# Patient Record
Sex: FEMALE | Race: WHITE | NOT HISPANIC OR LATINO | Employment: OTHER | URBAN - METROPOLITAN AREA
[De-identification: names, ages, dates, MRNs, and addresses within clinical notes are randomized per-mention and may not be internally consistent; named-entity substitution may affect disease eponyms.]

---

## 2017-08-02 ENCOUNTER — TRANSCRIBE ORDERS (OUTPATIENT)
Dept: ADMINISTRATIVE | Facility: HOSPITAL | Age: 57
End: 2017-08-02

## 2017-08-02 DIAGNOSIS — M81.0 OSTEOPOROSIS, UNSPECIFIED: Primary | ICD-10-CM

## 2017-08-25 ENCOUNTER — HOSPITAL ENCOUNTER (OUTPATIENT)
Dept: BONE DENSITY | Facility: HOSPITAL | Age: 57
Discharge: HOME | End: 2017-08-25
Admitting: NURSE PRACTITIONER

## 2017-08-25 DIAGNOSIS — M81.0 OSTEOPOROSIS, UNSPECIFIED: ICD-10-CM

## 2017-08-25 PROCEDURE — 77080 DXA BONE DENSITY AXIAL: CPT

## 2019-07-25 ENCOUNTER — TRANSCRIBE ORDERS (OUTPATIENT)
Dept: ADMINISTRATIVE | Facility: HOSPITAL | Age: 59
End: 2019-07-25

## 2019-07-25 DIAGNOSIS — Z12.31 VISIT FOR SCREENING MAMMOGRAM: Primary | ICD-10-CM

## 2019-09-06 ENCOUNTER — HOSPITAL ENCOUNTER (OUTPATIENT)
Dept: MAMMOGRAPHY | Facility: HOSPITAL | Age: 59
Discharge: HOME | End: 2019-09-06
Admitting: NURSE PRACTITIONER

## 2019-09-06 DIAGNOSIS — Z12.31 VISIT FOR SCREENING MAMMOGRAM: ICD-10-CM

## 2019-09-06 PROCEDURE — 77063 BREAST TOMOSYNTHESIS BI: CPT

## 2019-09-06 PROCEDURE — 77067 SCR MAMMO BI INCL CAD: CPT

## 2019-09-06 PROCEDURE — 77063 BREAST TOMOSYNTHESIS BI: CPT | Performed by: RADIOLOGY

## 2019-09-06 PROCEDURE — 77067 SCR MAMMO BI INCL CAD: CPT | Performed by: RADIOLOGY

## 2020-07-24 ENCOUNTER — TRANSCRIBE ORDERS (OUTPATIENT)
Dept: ADMINISTRATIVE | Facility: HOSPITAL | Age: 60
End: 2020-07-24

## 2020-07-24 DIAGNOSIS — M81.0 MENOPAUSAL OSTEOPOROSIS: ICD-10-CM

## 2020-07-24 DIAGNOSIS — Z12.31 VISIT FOR SCREENING MAMMOGRAM: Primary | ICD-10-CM

## 2020-12-11 ENCOUNTER — HOSPITAL ENCOUNTER (OUTPATIENT)
Dept: MAMMOGRAPHY | Facility: HOSPITAL | Age: 60
Discharge: HOME | End: 2020-12-11

## 2020-12-11 ENCOUNTER — HOSPITAL ENCOUNTER (OUTPATIENT)
Dept: BONE DENSITY | Facility: HOSPITAL | Age: 60
Discharge: HOME | End: 2020-12-11

## 2020-12-11 DIAGNOSIS — Z12.31 VISIT FOR SCREENING MAMMOGRAM: ICD-10-CM

## 2020-12-11 DIAGNOSIS — M81.0 MENOPAUSAL OSTEOPOROSIS: ICD-10-CM

## 2020-12-11 PROCEDURE — 77067 SCR MAMMO BI INCL CAD: CPT | Performed by: RADIOLOGY

## 2020-12-11 PROCEDURE — 77067 SCR MAMMO BI INCL CAD: CPT

## 2020-12-11 PROCEDURE — 77080 DXA BONE DENSITY AXIAL: CPT

## 2020-12-11 PROCEDURE — 77063 BREAST TOMOSYNTHESIS BI: CPT | Performed by: RADIOLOGY

## 2020-12-11 PROCEDURE — 77063 BREAST TOMOSYNTHESIS BI: CPT

## 2021-09-03 ENCOUNTER — HOSPITAL ENCOUNTER (EMERGENCY)
Facility: HOSPITAL | Age: 61
Discharge: HOME | End: 2021-09-04
Attending: EMERGENCY MEDICINE | Admitting: EMERGENCY MEDICINE

## 2021-09-03 VITALS
HEART RATE: 89 BPM | TEMPERATURE: 98.5 F | SYSTOLIC BLOOD PRESSURE: 119 MMHG | OXYGEN SATURATION: 92 % | HEIGHT: 61 IN | WEIGHT: 92 LBS | RESPIRATION RATE: 16 BRPM | DIASTOLIC BLOOD PRESSURE: 60 MMHG | BODY MASS INDEX: 17.37 KG/M2

## 2021-09-03 DIAGNOSIS — T18.9XXA SWALLOWED FOREIGN BODY, INITIAL ENCOUNTER: Primary | ICD-10-CM

## 2021-09-03 PROCEDURE — 99282 EMERGENCY DEPT VISIT SF MDM: CPT

## 2021-09-04 ENCOUNTER — APPOINTMENT (OUTPATIENT)
Dept: GENERAL RADIOLOGY | Facility: HOSPITAL | Age: 61
End: 2021-09-04

## 2021-09-04 PROCEDURE — 74022 RADEX COMPL AQT ABD SERIES: CPT

## 2021-09-04 NOTE — ED NOTES
Dr. Larios called per Dr. Claudio without answer. Voicemail left at this time.      Carlos Lyle  09/04/21 0126

## 2021-09-04 NOTE — ED NOTES
Dr. Larios called again at this time. Still no answer. House Supervisor called and made aware. Zina stated that she attempt to call him at this time.      Carlos Lyle  09/04/21 0213

## 2021-09-04 NOTE — NURSING NOTE
Placed 2 calls to Dr. Larios at this time in regards to a Pt in the ED that Dr. Claudio wanted to ask him to consult on. 1 voicemail message left.

## 2021-09-04 NOTE — ED PROVIDER NOTES
Subjective   61-year-old female presenting with swallowed foreign body.  She states that about 1-1/2 hours prior to arrival she accidentally swallowed the tab on a pop can.  She states that she feels like it is stuck in her throat.  She has no other complaints.  She is concerned because she has history of an ileostomy about 40 years ago, is concerned that the pop tab would not pass through.          Review of Systems   Constitutional: Negative.    HENT: Negative.    Eyes: Negative.    Respiratory: Negative.    Cardiovascular: Negative.    Gastrointestinal: Negative.    Genitourinary: Negative.    Musculoskeletal: Negative.    Skin: Negative.    Neurological: Negative.    Psychiatric/Behavioral: Negative.        Past Medical History:   Diagnosis Date   • Crohn disease (CMS/HCC)    • Disease of thyroid gland        Allergies   Allergen Reactions   • Codeine GI Intolerance   • Zithromax [Azithromycin] Itching       Past Surgical History:   Procedure Laterality Date   • ILEOSTOMY         Family History   Problem Relation Age of Onset   • Breast cancer Neg Hx    • Ovarian cancer Neg Hx        Social History     Socioeconomic History   • Marital status:      Spouse name: Not on file   • Number of children: Not on file   • Years of education: Not on file   • Highest education level: Not on file   Tobacco Use   • Smoking status: Current Every Day Smoker     Packs/day: 0.50     Types: Cigarettes   Substance and Sexual Activity   • Alcohol use: Yes           Objective   Physical Exam  Constitutional:       General: She is not in acute distress.     Appearance: Normal appearance. She is not ill-appearing, toxic-appearing or diaphoretic.   HENT:      Head: Normocephalic and atraumatic.      Right Ear: External ear normal.      Left Ear: External ear normal.      Nose: Nose normal.      Mouth/Throat:      Mouth: Mucous membranes are moist.      Pharynx: Oropharynx is clear.   Eyes:      Extraocular Movements: Extraocular  movements intact.   Cardiovascular:      Rate and Rhythm: Normal rate and regular rhythm.      Pulses: Normal pulses.      Heart sounds: Normal heart sounds.   Pulmonary:      Effort: Pulmonary effort is normal. No respiratory distress.      Breath sounds: Normal breath sounds.   Abdominal:      General: Bowel sounds are normal. There is no distension.      Tenderness: There is no abdominal tenderness.      Comments: Ostomy present   Musculoskeletal:         General: No swelling, tenderness or deformity. Normal range of motion.      Cervical back: Normal range of motion.   Skin:     General: Skin is warm and dry.      Capillary Refill: Capillary refill takes less than 2 seconds.      Findings: No rash.   Neurological:      General: No focal deficit present.      Mental Status: She is alert and oriented to person, place, and time.   Psychiatric:         Mood and Affect: Mood normal.         Behavior: Behavior normal.         Procedures           ED Course                                           MDM  Number of Diagnoses or Management Options  Swallowed foreign body, initial encounter  Diagnosis management comments: 61-year-old female with concern about swallowed foreign body.  Well-developed, well-nourished lady in no distress with exam as above.  Will obtain x-rays.  Disposition pending.    DDx: Swallowed foreign body    X-ray does reveal foreign body in the area of the gastric body.  Patient is very concerned given her ileostomy.  So I did attempt to contact Dr. Larios with general surgery, unfortunately we were unable to get him on the phone.  Patient is understandably upset.  I do feel patient is safe for discharge home as I do not think surgical consultation would change her disposition.  We will try to reach someone from the surgical service first thing in the morning and call patient back.  She is agreeable.      Final diagnoses:   Swallowed foreign body, initial encounter          Rashard Claudio,  MD  09/04/21 0337

## 2021-09-04 NOTE — ED NOTES
Dr. Larios called again at this time. Still no answer. Another voicemail left.      Carlos Lyle  09/04/21 0152

## 2021-09-04 NOTE — ED NOTES
Pt upset that she is still here waiting on surgeon to call back. MD notified. House supervisor is attempting to call [facility] and Dr Larios again     Naomie Ba, RN  09/04/21 3593

## 2022-07-20 ENCOUNTER — HOSPITAL ENCOUNTER (EMERGENCY)
Facility: HOSPITAL | Age: 62
Discharge: ANOTHER HOSPITAL | End: 2022-07-20
Attending: EMERGENCY MEDICINE | Admitting: EMERGENCY MEDICINE

## 2022-07-20 ENCOUNTER — APPOINTMENT (OUTPATIENT)
Dept: CT IMAGING | Facility: HOSPITAL | Age: 62
End: 2022-07-20

## 2022-07-20 ENCOUNTER — APPOINTMENT (OUTPATIENT)
Dept: GENERAL RADIOLOGY | Facility: HOSPITAL | Age: 62
End: 2022-07-20

## 2022-07-20 VITALS
OXYGEN SATURATION: 96 % | WEIGHT: 90 LBS | HEART RATE: 80 BPM | DIASTOLIC BLOOD PRESSURE: 74 MMHG | BODY MASS INDEX: 16.99 KG/M2 | TEMPERATURE: 98.1 F | SYSTOLIC BLOOD PRESSURE: 111 MMHG | RESPIRATION RATE: 16 BRPM | HEIGHT: 61 IN

## 2022-07-20 DIAGNOSIS — M54.2 PAIN OF NECK WITH RECENT TRAUMATIC INJURY: Primary | ICD-10-CM

## 2022-07-20 PROCEDURE — 25010000002 DEXAMETHASONE SODIUM PHOSPHATE 10 MG/ML SOLUTION: Performed by: NURSE PRACTITIONER

## 2022-07-20 PROCEDURE — 73030 X-RAY EXAM OF SHOULDER: CPT

## 2022-07-20 PROCEDURE — 96374 THER/PROPH/DIAG INJ IV PUSH: CPT

## 2022-07-20 PROCEDURE — 96375 TX/PRO/DX INJ NEW DRUG ADDON: CPT

## 2022-07-20 PROCEDURE — 25010000002 FENTANYL CITRATE (PF) 50 MCG/ML SOLUTION: Performed by: NURSE PRACTITIONER

## 2022-07-20 PROCEDURE — 99283 EMERGENCY DEPT VISIT LOW MDM: CPT

## 2022-07-20 PROCEDURE — 72125 CT NECK SPINE W/O DYE: CPT

## 2022-07-20 PROCEDURE — 25010000002 KETOROLAC TROMETHAMINE PER 15 MG: Performed by: NURSE PRACTITIONER

## 2022-07-20 PROCEDURE — 25010000002 ORPHENADRINE CITRATE PER 60 MG: Performed by: NURSE PRACTITIONER

## 2022-07-20 RX ORDER — SODIUM CHLORIDE 0.9 % (FLUSH) 0.9 %
10 SYRINGE (ML) INJECTION AS NEEDED
Status: DISCONTINUED | OUTPATIENT
Start: 2022-07-20 | End: 2022-07-20 | Stop reason: HOSPADM

## 2022-07-20 RX ORDER — KETOROLAC TROMETHAMINE 30 MG/ML
15 INJECTION, SOLUTION INTRAMUSCULAR; INTRAVENOUS ONCE
Status: COMPLETED | OUTPATIENT
Start: 2022-07-20 | End: 2022-07-20

## 2022-07-20 RX ORDER — ORPHENADRINE CITRATE 30 MG/ML
60 INJECTION INTRAMUSCULAR; INTRAVENOUS ONCE
Status: COMPLETED | OUTPATIENT
Start: 2022-07-20 | End: 2022-07-20

## 2022-07-20 RX ORDER — ALBUTEROL SULFATE 90 UG/1
2 AEROSOL, METERED RESPIRATORY (INHALATION) EVERY 4 HOURS PRN
COMMUNITY

## 2022-07-20 RX ORDER — FENTANYL CITRATE 50 UG/ML
25 INJECTION, SOLUTION INTRAMUSCULAR; INTRAVENOUS
Status: DISCONTINUED | OUTPATIENT
Start: 2022-07-20 | End: 2022-07-20 | Stop reason: HOSPADM

## 2022-07-20 RX ORDER — TRAZODONE HYDROCHLORIDE 50 MG/1
50 TABLET ORAL NIGHTLY
COMMUNITY

## 2022-07-20 RX ORDER — DEXAMETHASONE SODIUM PHOSPHATE 10 MG/ML
10 INJECTION, SOLUTION INTRAMUSCULAR; INTRAVENOUS ONCE
Status: COMPLETED | OUTPATIENT
Start: 2022-07-20 | End: 2022-07-20

## 2022-07-20 RX ORDER — LEVOTHYROXINE SODIUM 0.2 MG/1
200 TABLET ORAL DAILY
COMMUNITY

## 2022-07-20 RX ADMIN — DEXAMETHASONE SODIUM PHOSPHATE 10 MG: 10 INJECTION INTRAMUSCULAR; INTRAVENOUS at 15:51

## 2022-07-20 RX ADMIN — KETOROLAC TROMETHAMINE 15 MG: 30 INJECTION, SOLUTION INTRAMUSCULAR; INTRAVENOUS at 15:50

## 2022-07-20 RX ADMIN — ORPHENADRINE CITRATE 60 MG: 60 INJECTION INTRAMUSCULAR; INTRAVENOUS at 15:48

## 2022-07-20 RX ADMIN — FENTANYL CITRATE 25 MCG: 50 INJECTION INTRAMUSCULAR; INTRAVENOUS at 18:49

## 2022-07-20 NOTE — ED PROVIDER NOTES
Subjective   62-year-old female presents to the ED today for complaint of a fall a week ago.  She says she was coughing and fell off the porch.  She hit her right side but her left neck and shoulder is painful.  She has pain with range of motion.  She has pain with just sitting doing nothing.  She has been taking ibuprofen and it helps some along with some pain patches that she has been using.  She denies any other injury today.  The pain is keeping her awake at night.          Review of Systems   Constitutional: Negative.    HENT: Negative.    Eyes: Negative.    Respiratory: Negative.    Cardiovascular: Negative.    Gastrointestinal: Negative.    Endocrine: Negative.    Genitourinary: Negative.    Musculoskeletal: Positive for joint swelling, myalgias and neck pain.   Skin: Negative.    Allergic/Immunologic: Negative.    Neurological: Negative.    Hematological: Negative.    Psychiatric/Behavioral: Negative.        Past Medical History:   Diagnosis Date   • Crohn disease (HCC)    • Disease of thyroid gland        Allergies   Allergen Reactions   • Codeine GI Intolerance   • Zithromax [Azithromycin] Itching       Past Surgical History:   Procedure Laterality Date   • ILEOSTOMY         Family History   Problem Relation Age of Onset   • Breast cancer Neg Hx    • Ovarian cancer Neg Hx        Social History     Socioeconomic History   • Marital status:    Tobacco Use   • Smoking status: Current Every Day Smoker     Packs/day: 0.50     Types: Cigarettes   Substance and Sexual Activity   • Alcohol use: Yes           Objective   Physical Exam  Constitutional:       Appearance: Normal appearance.   HENT:      Head: Normocephalic and atraumatic.      Mouth/Throat:      Mouth: Mucous membranes are moist.   Eyes:      Extraocular Movements: Extraocular movements intact.      Conjunctiva/sclera: Conjunctivae normal.      Pupils: Pupils are equal, round, and reactive to light.   Neck:      Comments: Left-sided neck  tenderness with movement of neck and shoulder  Cardiovascular:      Rate and Rhythm: Normal rate and regular rhythm.      Pulses: Normal pulses.      Heart sounds: Normal heart sounds.   Pulmonary:      Effort: Pulmonary effort is normal.      Breath sounds: Normal breath sounds.   Abdominal:      General: Abdomen is flat.   Musculoskeletal:         General: Tenderness present.      Cervical back: Tenderness present.      Comments: Tenderness to left arm and shoulder   Skin:     General: Skin is warm and dry.      Capillary Refill: Capillary refill takes less than 2 seconds.   Neurological:      Mental Status: She is alert and oriented to person, place, and time.   Psychiatric:         Mood and Affect: Mood normal.         Behavior: Behavior normal.         Thought Content: Thought content normal.         Judgment: Judgment normal.         Procedures           ED Course  ED Course as of 07/20/22 1739 Wed Jul 20, 2022   1605 Discussed this fracture and CT scan results with Dr. Kelly who believes that this may be surgical and need to be fused.  He recommends calling . [JK]   1645 Called  1630, await call back  [JK]   1654 Asked CT to powershare images to  [JK]   1702 Discussed with Dr Wing,  kcats for transfer to  er [JK]      ED Course User Index  [JK] Keena Pond, APRN                                           [facility]    Final diagnoses:   Pain of neck with recent traumatic injury       ED Disposition  ED Disposition     ED Disposition   Transfer to Another Facility     Condition   --    Comment   --             No follow-up provider specified.       Medication List      No changes were made to your prescriptions during this visit.          Keena Pond, DORINA  07/20/22 7589

## 2022-08-31 ENCOUNTER — TRANSCRIBE ORDERS (OUTPATIENT)
Dept: ADMINISTRATIVE | Facility: HOSPITAL | Age: 62
End: 2022-08-31

## 2022-08-31 DIAGNOSIS — Z12.31 ENCOUNTER FOR SCREENING MAMMOGRAM FOR MALIGNANT NEOPLASM OF BREAST: Primary | ICD-10-CM

## 2022-09-16 ENCOUNTER — TRANSCRIBE ORDERS (OUTPATIENT)
Dept: ADMINISTRATIVE | Facility: HOSPITAL | Age: 62
End: 2022-09-16

## 2022-09-19 ENCOUNTER — TRANSCRIBE ORDERS (OUTPATIENT)
Dept: ADMINISTRATIVE | Facility: HOSPITAL | Age: 62
End: 2022-09-19

## 2022-09-20 ENCOUNTER — TRANSCRIBE ORDERS (OUTPATIENT)
Dept: ADMINISTRATIVE | Facility: HOSPITAL | Age: 62
End: 2022-09-20

## 2022-09-20 DIAGNOSIS — F17.210 CIGARETTE SMOKER: Primary | ICD-10-CM

## 2022-09-20 DIAGNOSIS — F17.200 CURRENT SMOKER: Primary | ICD-10-CM

## 2022-10-05 ENCOUNTER — HOSPITAL ENCOUNTER (OUTPATIENT)
Dept: CT IMAGING | Facility: HOSPITAL | Age: 62
Discharge: HOME | End: 2022-10-05
Admitting: INTERNAL MEDICINE

## 2022-10-05 DIAGNOSIS — F17.200 CURRENT SMOKER: ICD-10-CM

## 2022-10-05 PROCEDURE — 71271 CT THORAX LUNG CANCER SCR C-: CPT

## 2022-11-16 ENCOUNTER — HOSPITAL ENCOUNTER (OUTPATIENT)
Dept: MAMMOGRAPHY | Facility: HOSPITAL | Age: 62
Discharge: HOME | End: 2022-11-16
Admitting: INTERNAL MEDICINE

## 2022-11-16 DIAGNOSIS — Z12.31 ENCOUNTER FOR SCREENING MAMMOGRAM FOR MALIGNANT NEOPLASM OF BREAST: ICD-10-CM

## 2022-11-16 PROCEDURE — 77067 SCR MAMMO BI INCL CAD: CPT

## 2022-11-16 PROCEDURE — 77063 BREAST TOMOSYNTHESIS BI: CPT

## 2023-01-19 ENCOUNTER — TELEPHONE (OUTPATIENT)
Dept: NEUROSURGERY | Facility: CLINIC | Age: 63
End: 2023-01-19
Payer: COMMERCIAL

## 2023-01-19 NOTE — TELEPHONE ENCOUNTER
PATIENT CALLED BACK IN AND STATES SHE HAD HER MRI AND XRAY DONE AT  IN AUGUST, AND WAS GOING TO  THE DISC OF BOTH ON 1/20/23.

## 2023-01-19 NOTE — TELEPHONE ENCOUNTER
Provider:  Javier  Surgery/Procedure: EBEN   Surgery/Procedure Date: NA   Last visit:   NA  Next visit: 01/24/2022     Reason for call:    Attempted to contact patient to see where she had MRI completed, so I can see if it can be power shared prior to appointment on Tuesday. No answer, LVM to call back.

## 2023-01-20 NOTE — TELEPHONE ENCOUNTER
I requested images to be power shared (XR and MRI). Images located in patient's chart. Closing encounter.

## 2023-01-24 ENCOUNTER — OFFICE VISIT (OUTPATIENT)
Dept: NEUROSURGERY | Facility: CLINIC | Age: 63
End: 2023-01-24
Payer: COMMERCIAL

## 2023-01-24 VITALS — BODY MASS INDEX: 15.86 KG/M2 | WEIGHT: 84 LBS | TEMPERATURE: 97.8 F | HEIGHT: 61 IN

## 2023-01-24 DIAGNOSIS — M50.30 DDD (DEGENERATIVE DISC DISEASE), CERVICAL: ICD-10-CM

## 2023-01-24 DIAGNOSIS — M25.511 ACUTE PAIN OF RIGHT SHOULDER: Primary | ICD-10-CM

## 2023-01-24 PROCEDURE — 99203 OFFICE O/P NEW LOW 30 MIN: CPT | Performed by: NEUROLOGICAL SURGERY

## 2023-01-24 RX ORDER — ONDANSETRON 4 MG/1
4 TABLET, FILM COATED ORAL AS NEEDED
COMMUNITY

## 2023-01-24 RX ORDER — MULTIVITAMIN WITH IRON
250 TABLET ORAL DAILY
COMMUNITY

## 2023-01-24 RX ORDER — LIDOCAINE 5 %
ADHESIVE PATCH, MEDICATED TOPICAL
COMMUNITY
Start: 2022-12-19

## 2023-01-24 RX ORDER — UMECLIDINIUM BROMIDE AND VILANTEROL TRIFENATATE 62.5; 25 UG/1; UG/1
POWDER RESPIRATORY (INHALATION)
COMMUNITY
Start: 2022-12-19

## 2023-01-24 RX ORDER — IBUPROFEN 800 MG/1
800 TABLET ORAL 3 TIMES DAILY PRN
COMMUNITY
Start: 2022-12-19

## 2023-01-24 RX ORDER — NICOTINE POLACRILEX 4 MG/1
1 GUM, CHEWING ORAL DAILY
COMMUNITY
Start: 2022-12-19

## 2023-01-24 RX ORDER — ALENDRONATE SODIUM 70 MG/1
TABLET ORAL
COMMUNITY
Start: 2022-12-19

## 2023-01-24 RX ORDER — VALACYCLOVIR HYDROCHLORIDE 500 MG/1
500 TABLET, FILM COATED ORAL DAILY
COMMUNITY
Start: 2022-12-19

## 2023-01-24 NOTE — PROGRESS NOTES
Patient: Elena Ma  : 1960    Primary Care Provider: Merly Young MD    Requesting Provider: As above        History    Chief Complaint: Right upper extremity pain.    History of Present Illness: Ms. Ma is a 62-year-old unemployed woman who suffered a fall in July.  She struck her right side.  She was noted to have a nondisplaced cervical fracture and was seen at the [facility].  She was treated for a brief period of time with a collar.  She never really had much in the way of neck pain.  Initially she had some left arm symptoms and continues to have some numbness in her left index finger.  However she developed progressive pain involving her right shoulder and upper arm.  Sometimes this will extend more distally.  Symptoms are worse if she simply moves or touches her right upper extremity.  Early on she was treated with Neurontin and muscle relaxants.  She has not done physical therapy as recommended by neurosurgery at the [facility].  The patient smokes heavily.    Review of Systems   Constitutional: Positive for activity change, fatigue and unexpected weight change. Negative for appetite change, chills, diaphoresis and fever.   HENT: Negative for congestion, dental problem, drooling, ear discharge, ear pain, facial swelling, hearing loss, mouth sores, nosebleeds, postnasal drip, rhinorrhea, sinus pressure, sinus pain, sneezing, sore throat, tinnitus, trouble swallowing and voice change.    Eyes: Negative for photophobia, pain, discharge, redness, itching and visual disturbance.   Respiratory: Negative for apnea, cough, choking, chest tightness, shortness of breath, wheezing and stridor.    Cardiovascular: Negative for chest pain, palpitations and leg swelling.   Gastrointestinal: Negative for abdominal distention, abdominal pain, anal bleeding, blood in stool, constipation, diarrhea, nausea, rectal pain and vomiting.   Endocrine: Negative for cold intolerance, heat intolerance,  "polydipsia, polyphagia and polyuria.   Genitourinary: Negative for decreased urine volume, difficulty urinating, dysuria, enuresis, flank pain, frequency, genital sores, hematuria and urgency.   Musculoskeletal: Negative for arthralgias, back pain, gait problem, joint swelling, myalgias, neck pain and neck stiffness.   Skin: Negative for color change, pallor, rash and wound.   Allergic/Immunologic: Negative for environmental allergies, food allergies and immunocompromised state.   Neurological: Positive for numbness. Negative for dizziness, tremors, seizures, syncope, facial asymmetry, speech difficulty, weakness, light-headedness and headaches.   Hematological: Negative for adenopathy. Does not bruise/bleed easily.   Psychiatric/Behavioral: Negative for agitation, behavioral problems, confusion, decreased concentration, dysphoric mood, hallucinations, self-injury, sleep disturbance and suicidal ideas. The patient is not nervous/anxious and is not hyperactive.    All other systems reviewed and are negative.      The patient's past medical history, past surgical history, family history, and social history have been reviewed at length in the electronic medical record.      Physical Exam:   Temp 97.8 °F (36.6 °C)   Ht 154.9 cm (61\")   Wt 38.1 kg (84 lb)   BMI 15.87 kg/m²   CONSTITUTIONAL: Patient is well-nourished, pleasant and appears stated age.  MUSCULOSKELETAL:  Neck tenderness to palpation is not observed.   ROM in the neck is normal.  Simple palpation or attempted movement of her right upper extremity specifically the right shoulder right upper arm induces severe pain.  NEUROLOGICAL:  Orientation, memory, attention span, language function, and cognition have been examined and are intact.  Strength is intact in the upper and lower extremities to direct testing.  Muscle tone is normal throughout.  Station and gait are normal.  Sensation is intact to light touch testing throughout.  Deep tendon reflexes are 1+ " and symmetrical.  Issa's Sign is negative bilaterally. No clonus is elicited.  Coordination is intact.      Medical Decision Making    Data Review:   (All imaging studies were personally reviewed unless stated otherwise)  MRI of the cervical spine dated 8/23/2022 demonstrates some diffuse spondylosis.  There is some broad-based spurring at C5-6.  There is no cord compromise.     CT of the cervical spine dated 7/20/2022 demonstrates a nondisplaced fracture through the left C6-7 facet complex and into the ventral aspect of the lamina on that side.  Other fractures are not noted.    Diagnosis:   1.  Right upper extremity pain likely related to the right shoulder or upper extremity itself.  The complaints and examination would not suggest a cervical issue at this point.  2.  History of left C6-7 nondisplaced facet and laminar fracture, stable.    Treatment Options:   Presently, I would not recommend intervention on her neck.  I am going to make a referral to orthopedics for assessment of her right shoulder and upper arm.       Diagnosis Plan   1. Acute pain of right shoulder        2. DDD (degenerative disc disease), cervical            Scribed for Alphonso Herrera MD by RUPINDER Lopez 1/24/2023 10:40 EST    I, Dr. Herrera, personally performed the services described in the documentation, as scribed in my presence, and it is both accurate and complete.

## 2023-04-21 DIAGNOSIS — M25.511 ARTHRALGIA OF RIGHT SHOULDER REGION: Primary | ICD-10-CM

## 2023-04-24 ENCOUNTER — OFFICE VISIT (OUTPATIENT)
Dept: ORTHOPEDIC SURGERY | Facility: CLINIC | Age: 63
End: 2023-04-24
Payer: COMMERCIAL

## 2023-04-24 VITALS
BODY MASS INDEX: 16.69 KG/M2 | DIASTOLIC BLOOD PRESSURE: 48 MMHG | HEIGHT: 61 IN | SYSTOLIC BLOOD PRESSURE: 102 MMHG | WEIGHT: 88.4 LBS

## 2023-04-24 DIAGNOSIS — M75.01 ADHESIVE CAPSULITIS OF RIGHT SHOULDER: Primary | ICD-10-CM

## 2023-04-24 DIAGNOSIS — M25.511 ARTHRALGIA OF RIGHT SHOULDER REGION: ICD-10-CM

## 2023-04-24 RX ORDER — ALENDRONATE SODIUM 70 MG/1
70 TABLET ORAL
COMMUNITY
Start: 2023-03-08

## 2023-04-24 RX ORDER — LEVOTHYROXINE SODIUM 175 UG/1
1 TABLET ORAL DAILY
COMMUNITY
Start: 2023-03-29

## 2023-04-24 RX ORDER — VARENICLINE TARTRATE 25 MG
KIT ORAL
COMMUNITY
Start: 2023-03-22

## 2023-04-24 NOTE — PROGRESS NOTES
"Subjective   Patient ID: Elena Ma is a 63 y.o. right hand dominant  female referred by Dr. Herrera and is being seen for orthopaedic evaluation today for chronic right shoulder.  Pain of the Right Shoulder (Referred by Dr MINNA Herrera)       CHIEF COMPLAINT:    Right shoulder pain and gradual pronounced stiffness, and with history of a fall last July with a cervical spine compression fracture.    History of Present Illness     63-year-old woman that presents to our orthopedic clinic today referred by Dr. Slick Roche MD for evaluation of a right shoulder condition.  Of note the patient had a history of a fall from a porch to the ground last July 2022 and she tells me that after waiting about 5 days she went for evaluation and was diagnosed with a cervical spine compression fracture.  The fracture was treated with brace immobilization and subsequent rehabilitation therapy with the good recovery.  Then in about August 2022 she began to notice at first pain in her left shoulder then developing both pain and stiffness in her right shoulder.  The right shoulder has gradually become very stiff both in elevation and rotation.  She does report a history of thyroid disorder and mentions that recently her thyroid medication dose required to be changed.  Her shoulder exam today is consistent with adhesive capsulitis or \"frozen shoulder\", and this condition and its treatment were reviewed with the patient and her questions answered.  There is also possibility that she has a small rotator cuff tear and she also has mild osteoarthritis of her right shoulder given her imaging findings, however these conditions would not typically have such pronounced active and passive restriction of motion and shoulder stiffness.  Reviewed the algorithm for treatment of this condition including initial formal physical therapy with anti-inflammatory medication as tolerated, and then depending on her progress should there be a plateau in " therapy or limited recovery, options would include potential manipulation under anesthesia with glenohumeral joint corticosteroid injection therapy, and or arthroscopic evaluation of the shoulder with capsular release.    Past Medical History:   Diagnosis Date   • Arthritis    • Back problem    • Bronchitis    • Cervical compression fracture 07/2022    treated conservatively with a brace x 5 weeks   • COPD, moderate    • Crohn disease    • Disease of thyroid gland    • H/O bladder infections    • Hernia, internal    • Osteoporosis    • Pneumonia    • Rotator cuff syndrome 7 2022   • Scoliosis 7 2020   • Thyroid disease         Past Surgical History:   Procedure Laterality Date   • ILEOSTOMY         Family History   Problem Relation Age of Onset   • Cancer Mother    • Heart disease Father    • Heart attack Father    • Diabetes Sister    • Scoliosis Sister    • Kidney disease Sister    • Crohn's disease Sister    • Lung disease Sister    • Kidney disease Brother    • Diabetes Brother    • Arthritis Brother    • Aneurysm Brother    • Gout Brother    • Broken bones Brother    • Cancer Brother    • Arthritis Brother    • Diabetes Brother    • Breast cancer Neg Hx    • Ovarian cancer Neg Hx         Social History     Socioeconomic History   • Marital status:    Tobacco Use   • Smoking status: Every Day     Packs/day: 1.00     Years: 50.00     Pack years: 50.00     Types: Cigarettes   • Smokeless tobacco: Never   Vaping Use   • Vaping Use: Never used   Substance and Sexual Activity   • Alcohol use: Yes     Alcohol/week: 2.0 standard drinks     Types: 2 Drinks containing 0.5 oz of alcohol per week   • Drug use: Never   • Sexual activity: Not Currently       Allergies   Allergen Reactions   • Codeine GI Intolerance   • Hydrocodone Other (See Comments)     nausea   • Levofloxacin Rash   • Zithromax [Azithromycin] Itching       Review of Systems   Constitutional: Negative for fever.   Musculoskeletal: Positive for  "arthralgias. Negative for gait problem and joint swelling.   Skin: Negative for color change and rash.   Neurological: Negative for weakness.   Psychiatric/Behavioral: Negative for confusion.     I have reviewed the medical and surgical history, family history, social history, medications, and/or allergies, and the review of systems of this report.    Objective   /48 (BP Location: Left arm, Patient Position: Sitting, Cuff Size: Adult)   Ht 154.9 cm (61\")   Wt 40.1 kg (88 lb 6.4 oz)   BMI 16.70 kg/m²   Physical Exam  Vitals reviewed.   Constitutional:       General: She is not in acute distress.     Appearance: She is well-developed.   Skin:     General: Skin is warm and dry.      Findings: No erythema or rash.   Neurological:      Mental Status: She is alert.      Gait: Gait is intact.   Psychiatric:         Speech: Speech normal.       Right Shoulder Exam     Tenderness   Right shoulder tenderness location: diffuse shoulder soreness.    Range of Motion   Active abduction: 90   Passive abduction: 110   External rotation: 30   Forward flexion: 100   Internal rotation 90 degrees: 10     Muscle Strength   Abduction: 4/5   Internal rotation: 5/5   External rotation: 5/5   Biceps: 5/5     Tests   Apprehension: negative  Impingement: positive  Drop arm: negative    Other   Erythema: absent  Pulse: present           Neurologic Exam     Mental Status   Attention: normal.   Speech: speech is normal   Level of consciousness: alert  Knowledge: good.     Motor Exam   Overall muscle tone: normal    Gait, Coordination, and Reflexes     Gait  Gait: normal       Assessment & Plan     Independent Review of Radiographic Studies:    AP, Grashey AP and Y lateral views of the right shoulder, indication to evaluate pain, no prior comparison views or not available, shows no acute fracture or dislocation evident.    Laboratory and Other Studies:  No new results reviewed today.     Medical Decision Making:    Limited progress with " persistent or worsening symptoms of right shoulder adhesive capsulitis.   Continue care plan with any additional work-up and treatment as outlined below.  Medications as prescribed and only as tolerated.  Physical and occupational therapy planned.    Procedures    Diagnoses and all orders for this visit:    1. Adhesive capsulitis of right shoulder (Primary)  -     Ambulatory Referral to Physical Therapy Ortho, Evaluate and treat; ROM, Strengthening    2. Arthralgia of right shoulder region       Discussion of orthopaedic goals and activities and patient expressed appreciation.  Risk, benefits, and merits of treatment alternatives reviewed with the patient and questions answered  Regular exercise as tolerated  Guided on proper techniques for mobility, strength, agility and/or conditioning exercises  Ice, heat, and/or modalities as beneficial  Physical therapy referral given  Take prescribed medications as instructed only as tolerated    Recommendations/Plan:  Exercise, medications, injections, other patient advice, and return appointment as noted.  Brace: No brace was given at today's visit.  Referral: Physical and Occupational Therapy referral.  Test/Studies: No additional studies ordered at this time. Consider MRI or other imaging depending on clinical progress.  Work/Activity Status: Usual activities, routine exercise as tolerated, light physical work as tolerated, no strenuous activity.    Return in about 8 weeks (around 6/19/2023) for Recheck after physical therapy.  Patient is encouraged and agreeable to call or return sooner for any issues or concerns.

## 2023-04-25 ENCOUNTER — PATIENT ROUNDING (BHMG ONLY) (OUTPATIENT)
Dept: ORTHOPEDIC SURGERY | Facility: CLINIC | Age: 63
End: 2023-04-25
Payer: COMMERCIAL

## 2023-04-25 NOTE — PROGRESS NOTES
April 25, 2023    Hello, may I speak with Elena Ma?    My name is Smiley      I am  with [facility] [address] [address] [phone].    Before we get started may I verify your date of birth? 1960    I am calling to officially welcome you to our practice and ask about your recent visit. Is this a good time to talk? No - left voicemail    Tell me about your visit with us. What things went well?         We're always looking for ways to make our patients' experiences even better. Do you have recommendations on ways we may improve?      Overall were you satisfied with your first visit to our practice?       I appreciate you taking the time to speak with me today. Is there anything else I can do for you?       Thank you, and have a great day.

## 2023-07-21 ENCOUNTER — OFFICE VISIT (OUTPATIENT)
Dept: ORTHOPEDIC SURGERY | Facility: CLINIC | Age: 63
End: 2023-07-21
Payer: COMMERCIAL

## 2023-07-21 VITALS
SYSTOLIC BLOOD PRESSURE: 139 MMHG | BODY MASS INDEX: 16.97 KG/M2 | WEIGHT: 89.9 LBS | DIASTOLIC BLOOD PRESSURE: 58 MMHG | HEIGHT: 61 IN | HEART RATE: 66 BPM

## 2023-07-21 DIAGNOSIS — M25.612 STIFFNESS OF LEFT SHOULDER JOINT: ICD-10-CM

## 2023-07-21 DIAGNOSIS — M25.512 ARTHRALGIA OF LEFT SHOULDER REGION: Primary | ICD-10-CM

## 2023-07-21 DIAGNOSIS — M75.02 ADHESIVE CAPSULITIS OF LEFT SHOULDER: ICD-10-CM

## 2023-07-21 PROCEDURE — 99214 OFFICE O/P EST MOD 30 MIN: CPT | Performed by: ORTHOPAEDIC SURGERY

## 2023-07-21 RX ORDER — METHOCARBAMOL 750 MG/1
750 TABLET, FILM COATED ORAL 2 TIMES DAILY PRN
Qty: 40 TABLET | Refills: 0 | Status: SHIPPED | OUTPATIENT
Start: 2023-07-21

## 2023-07-21 RX ORDER — METHYLPREDNISOLONE 4 MG/1
TABLET ORAL
Qty: 21 TABLET | Refills: 0 | Status: SHIPPED | OUTPATIENT
Start: 2023-07-21

## 2023-07-27 ENCOUNTER — TRANSCRIBE ORDERS (OUTPATIENT)
Dept: ADMINISTRATIVE | Facility: HOSPITAL | Age: 63
End: 2023-07-27
Payer: COMMERCIAL

## 2023-07-27 DIAGNOSIS — Z12.31 VISIT FOR SCREENING MAMMOGRAM: Primary | ICD-10-CM

## 2023-07-31 ENCOUNTER — TELEPHONE (OUTPATIENT)
Dept: ORTHOPEDIC SURGERY | Facility: CLINIC | Age: 63
End: 2023-07-31
Payer: COMMERCIAL

## 2023-08-08 DIAGNOSIS — S43.431A LABRAL TEAR OF SHOULDER, RIGHT, INITIAL ENCOUNTER: ICD-10-CM

## 2023-08-08 DIAGNOSIS — M25.612 STIFFNESS OF LEFT SHOULDER JOINT: ICD-10-CM

## 2023-08-08 DIAGNOSIS — M75.02 ADHESIVE CAPSULITIS OF LEFT SHOULDER: ICD-10-CM

## 2023-08-08 DIAGNOSIS — M25.512 ARTHRALGIA OF LEFT SHOULDER REGION: Primary | ICD-10-CM

## 2023-08-08 RX ORDER — DEXAMETHASONE SODIUM PHOSPHATE 4 MG/ML
INJECTION, SOLUTION INTRA-ARTICULAR; INTRALESIONAL; INTRAMUSCULAR; INTRAVENOUS; SOFT TISSUE
Qty: 30 ML | Refills: 0 | Status: SHIPPED | OUTPATIENT
Start: 2023-08-08

## 2023-08-08 NOTE — TELEPHONE ENCOUNTER
Returned call to patient to inform patient [facility], ultrasound is on the order for PT

## 2023-08-08 NOTE — TELEPHONE ENCOUNTER
Patient called the office stating that she has a PT appointment with [facility] this morning at 10:30am. She is wanting someone to conmtact them to make sure that they are doing her tx as Dr. Duncan has ordered. She said that Dr. Duncan mentioned that they would need to do an ultrasound on her left shoulder and she has been there 3 times and they said that it was not on the order. She would like a call back before her appointment if possible to clarify.

## 2023-08-22 DIAGNOSIS — M25.512 ARTHRALGIA OF LEFT SHOULDER REGION: ICD-10-CM

## 2023-08-22 RX ORDER — METHOCARBAMOL 750 MG/1
750 TABLET, FILM COATED ORAL 2 TIMES DAILY PRN
Qty: 40 TABLET | Refills: 0 | Status: SHIPPED | OUTPATIENT
Start: 2023-08-22

## 2023-08-23 ENCOUNTER — TELEPHONE (OUTPATIENT)
Dept: ORTHOPEDIC SURGERY | Facility: CLINIC | Age: 63
End: 2023-08-23
Payer: COMMERCIAL

## 2023-08-23 NOTE — TELEPHONE ENCOUNTER
Caller: Elena Ma    Relationship: Self    Best call back number: [phone]    Requested Prescriptions: ROBAXIN 750 MG  Requested Prescriptions      No prescriptions requested or ordered in this encounter        Pharmacy where request should be sent:  [facility] [phone]    Last office visit with prescribing clinician: 7/21/2023     Next office visit with prescribing clinician: 9/8/2023     Does the patient have less than a 3 day supply:  [x] Yes  [] No

## 2023-08-23 NOTE — TELEPHONE ENCOUNTER
Spoke with patient, notified her the rx was sent yesterday (8/22/23) to [facility]. She states she will call them.

## 2023-08-26 DIAGNOSIS — J44.9 CHRONIC OBSTRUCTIVE PULMONARY DISEASE, UNSPECIFIED COPD TYPE: ICD-10-CM

## 2023-08-28 RX ORDER — UMECLIDINIUM BROMIDE AND VILANTEROL TRIFENATATE 62.5; 25 UG/1; UG/1
POWDER RESPIRATORY (INHALATION)
Qty: 180 EACH | Refills: 0 | Status: SHIPPED | OUTPATIENT
Start: 2023-08-28

## 2023-08-30 ENCOUNTER — HOSPITAL ENCOUNTER (OUTPATIENT)
Dept: MRI IMAGING | Facility: HOSPITAL | Age: 63
Discharge: HOME | End: 2023-08-30
Admitting: ORTHOPAEDIC SURGERY
Payer: COMMERCIAL

## 2023-08-30 PROCEDURE — 73221 MRI JOINT UPR EXTREM W/O DYE: CPT

## 2023-09-08 ENCOUNTER — OFFICE VISIT (OUTPATIENT)
Dept: ORTHOPEDIC SURGERY | Facility: CLINIC | Age: 63
End: 2023-09-08
Payer: COMMERCIAL

## 2023-09-08 VITALS
SYSTOLIC BLOOD PRESSURE: 108 MMHG | HEART RATE: 65 BPM | HEIGHT: 61 IN | BODY MASS INDEX: 16.8 KG/M2 | DIASTOLIC BLOOD PRESSURE: 60 MMHG | WEIGHT: 89 LBS

## 2023-09-08 DIAGNOSIS — M75.52 BURSITIS OF LEFT SHOULDER: Primary | ICD-10-CM

## 2023-09-08 RX ORDER — LIDOCAINE HYDROCHLORIDE 20 MG/ML
2 INJECTION, SOLUTION INFILTRATION; PERINEURAL
Status: COMPLETED | OUTPATIENT
Start: 2023-09-08 | End: 2023-09-08

## 2023-09-08 RX ORDER — METHYLPREDNISOLONE ACETATE 40 MG/ML
40 INJECTION, SUSPENSION INTRA-ARTICULAR; INTRALESIONAL; INTRAMUSCULAR; SOFT TISSUE
Status: COMPLETED | OUTPATIENT
Start: 2023-09-08 | End: 2023-09-08

## 2023-09-08 RX ADMIN — METHYLPREDNISOLONE ACETATE 40 MG: 40 INJECTION, SUSPENSION INTRA-ARTICULAR; INTRALESIONAL; INTRAMUSCULAR; SOFT TISSUE at 10:18

## 2023-09-08 RX ADMIN — LIDOCAINE HYDROCHLORIDE 2 ML: 20 INJECTION, SOLUTION INFILTRATION; PERINEURAL at 10:18

## 2023-09-08 NOTE — PROGRESS NOTES
Office Note     Name: Elena Ma    : 1960     MRN: [id number]     Chief Complaint  Follow-up of the Left Shoulder (Here to review MRI results. States she is still having pain but it is getting better.)    Subjective     History of Present Illness:  Elena Ma is a 63 y.o. female presenting today for left shoulder follow-up to discuss MRI results.  MRI results are shown below.  Patient states that her symptoms seem to be improving with physical therapy although she does have limited range of motion due to pain and continued pain with certain activities.  She denies any new or worsening symptoms.     Review of Systems   Constitutional:  Negative for fever.   HENT:  Negative for dental problem and voice change.    Eyes:  Negative for visual disturbance.   Respiratory:  Negative for shortness of breath.    Cardiovascular:  Negative for chest pain.   Gastrointestinal:  Negative for abdominal pain.   Genitourinary:  Negative for dysuria.   Musculoskeletal:  Positive for arthralgias (left shoulder). Negative for gait problem and joint swelling.   Skin:  Negative for rash.   Neurological:  Negative for speech difficulty.   Hematological:  Does not bruise/bleed easily.   Psychiatric/Behavioral:  Negative for confusion.       Past Medical History:   Diagnosis Date    Arthritis     Back problem     Bronchitis     Cervical compression fracture 2022    treated conservatively with a brace x 5 weeks    COPD, moderate     Crohn disease     Disease of thyroid gland     H/O bladder infections     Hernia, internal     Osteoporosis     Pneumonia     Rotator cuff syndrome 2022    Scoliosis 2020    Thyroid disease         Past Surgical History:   Procedure Laterality Date    ECTOPIC PREGNANCY SURGERY      ILEOSTOMY      STOMACH SURGERY      VAGINA RECONSTRUCTION SURGERY         Family History   Problem Relation Age of Onset    Cancer Mother     Heart disease Father     Heart attack Father     Diabetes Sister      Scoliosis Sister     Kidney disease Sister     Crohn's disease Sister     Lung disease Sister     Kidney disease Brother     Diabetes Brother     Arthritis Brother     Aneurysm Brother     Gout Brother     Broken bones Brother     Cancer Brother     Arthritis Brother     Diabetes Brother     Aneurysm Brother     Breast cancer Neg Hx     Ovarian cancer Neg Hx        Social History     Socioeconomic History    Marital status:    Tobacco Use    Smoking status: Every Day     Packs/day: 1.00     Years: 50.00     Pack years: 50.00     Types: Cigarettes    Smokeless tobacco: Never   Vaping Use    Vaping Use: Never used   Substance and Sexual Activity    Alcohol use: Yes     Alcohol/week: 2.0 standard drinks     Types: 2 Drinks containing 0.5 oz of alcohol per week    Drug use: Never    Sexual activity: Not Currently         Current Outpatient Medications:     albuterol sulfate  (90 Base) MCG/ACT inhaler, Inhale 2 puffs Every 4 (Four) Hours As Needed for Wheezing., Disp: 18 g, Rfl: 5    alendronate (FOSAMAX) 70 MG tablet, Take 1 tablet by mouth Every 7 (Seven) Days., Disp: 12 tablet, Rfl: 1    Anoro Ellipta 62.5-25 MCG/ACT aerosol powder  inhaler, Inhale 1 puff by mouth once daily, Disp: 180 each, Rfl: 0    Cholecalciferol (vitamin D3) 125 MCG (5000 UT) tablet tablet, Take 1 tablet by mouth Daily., Disp: 90 tablet, Rfl: 1    dexAMETHasone (DECADRON) 4 MG/ML injection, USE AS DIRECTED WITH PHYSICAL THERAPY (IONTOPHORESIS), Disp: 30 mL, Rfl: 0    gabapentin (NEURONTIN) 300 MG capsule, Take 1 capsule by mouth 3 (Three) Times a Day., Disp: 90 capsule, Rfl: 0    ibuprofen (ADVIL,MOTRIN) 800 MG tablet, Take 1 tablet by mouth 3 (Three) Times a Day As Needed., Disp: , Rfl:     levothyroxine (SYNTHROID, LEVOTHROID) 137 MCG tablet, Take 1 tablet by mouth Daily., Disp: 30 tablet, Rfl: 1    Lidoderm 5 %, APPLY 1 PATCH TOPICALLY ONCE DAILY (MAY WEAR UP TO 12 HOURS), Disp: , Rfl:     Magnesium 250 MG tablet, Take 1  "tablet by mouth Daily., Disp: , Rfl:     methocarbamol (ROBAXIN) 750 MG tablet, Take 1 tablet by mouth 2 (Two) Times a Day As Needed for Muscle Spasms., Disp: 40 tablet, Rfl: 0    methylPREDNISolone (MEDROL) 4 MG dose pack, Use as directed by package instructions, Disp: 21 tablet, Rfl: 0    Omeprazole 20 MG tablet delayed-release, Take 20 mg by mouth Daily., Disp: 90 each, Rfl: 1    ondansetron (ZOFRAN) 4 MG tablet, Take 1 tablet by mouth As Needed for Nausea or Vomiting., Disp: , Rfl:     Potassium 99 MG tablet, Take 1,000 mcg by mouth Daily., Disp: , Rfl:     traZODone (DESYREL) 50 MG tablet, Take 1 tablet by mouth At Night As Needed for Sleep., Disp: 90 tablet, Rfl: 1    valACYclovir (VALTREX) 500 MG tablet, Take 1 tablet by mouth Daily., Disp: 90 tablet, Rfl: 1    Allergies   Allergen Reactions    Codeine GI Intolerance    Hydrocodone Other (See Comments)     nausea    Levofloxacin Rash    Zithromax [Azithromycin] Itching           Objective   /60   Pulse 65   Ht 154.9 cm (60.98\")   Wt 40.4 kg (89 lb)   BMI 16.83 kg/m²    BMI is below normal parameters (malnutrition). Recommendations: none (medical contraindication)       Physical Exam  Left Shoulder Exam     Range of Motion   Active abduction:  140   Passive abduction:  140   Extension:  30   Forward flexion:  160   Internal rotation 0 degrees:  L4     Muscle Strength   The patient has normal left shoulder strength.    Tests   Montanez test: positive  Impingement: positive  Drop arm: negative    Other   Erythema: absent  Sensation: normal  Pulse: present          Extremity DVT signs are negative by clinical screen.     Independent Review of Radiographic Studies:    Narrative & Impression   LEFT SHOULDER MRI     HISTORY: Shoulder pain.     FINDINGS: Supraspinatus tendon appears intact. Trace amount of fluid is  seen in the subacromial/subdeltoid bursa, probably related to mild  bursitis.     Anterior and posterior glenoid bisi appear intact. Biceps " tendon  appears intact. Subscapularis tendon appears intact.     Minimal hypertrophic changes are seen at the acromioclavicular joint.     IMPRESSION:  1. No evidence of full-thickness supraspinatus tendon tear.  2. Small amount of fluid in subacromial/subdeltoid bursa, consistent  with mild bursitis.     This report was signed and finalized on 8/31/2023 2:35 PM by Ger Bocanegra MD.          Large Joint Arthrocentesis: L subacromial bursa  Date/Time: 9/8/2023 10:18 AM  Consent given by: patient  Site marked: site marked  Timeout: Immediately prior to procedure a time out was called to verify the correct patient, procedure, equipment, support staff and site/side marked as required   Supporting Documentation  Indications: pain   Procedure Details  Location: shoulder - L subacromial bursa  Preparation: Patient was prepped and draped in the usual sterile fashion  Needle size: 22 G  Approach: posterior  Medications administered: 2 mL lidocaine 2%; 40 mg methylPREDNISolone acetate 40 MG/ML  Patient tolerance: patient tolerated the procedure well with no immediate complications        Assessment and Plan   Diagnoses and all orders for this visit:    1. Bursitis of left shoulder (Primary)       Regular exercise as tolerated  Orthopedic activities reviewed and patient expressed appreciation  Discussion of orthopedic goals  Risk, benefits, and merits of treatment alternatives reviewed with the patient and questions answered  Physical therapy ongoing  Call or notify for any adverse effect from injection therapy  Avoid offending activity  Ice, heat, and/or modalities as beneficial  Guided on proper techniques for mobility, strength, agility and/or conditioning exercises    Recommendations/Plan:  Exercise, medications, injections, other patient advice, and return appointment as noted.  Patient is encouraged to call or return for any issues or concerns.    Patient was given a corticosteroid injection to the left shoulder for  symptomatic relief.  Advised her to continue with ongoing therapy.  Advised avoiding aggravating activities as well as over-the-counter analgesics and ice and heat as needed and beneficial.  I recommended over-the-counter Voltaren gel.  Patient is agreeable with plan.  I recommend her to follow-up as needed if her symptoms worsen or persist.    Return if symptoms worsen or fail to improve.  Patient agreeable to call or return sooner for any concerns.

## 2023-10-27 ENCOUNTER — OFFICE VISIT (OUTPATIENT)
Dept: INTERNAL MEDICINE | Facility: CLINIC | Age: 63
End: 2023-10-27
Payer: COMMERCIAL

## 2023-10-27 VITALS
DIASTOLIC BLOOD PRESSURE: 64 MMHG | BODY MASS INDEX: 16.99 KG/M2 | HEIGHT: 61 IN | SYSTOLIC BLOOD PRESSURE: 98 MMHG | TEMPERATURE: 97.6 F | OXYGEN SATURATION: 95 % | HEART RATE: 56 BPM | WEIGHT: 90 LBS

## 2023-10-27 DIAGNOSIS — M81.0 AGE-RELATED OSTEOPOROSIS WITHOUT CURRENT PATHOLOGICAL FRACTURE: ICD-10-CM

## 2023-10-27 DIAGNOSIS — M25.512 ARTHRALGIA OF LEFT SHOULDER REGION: ICD-10-CM

## 2023-10-27 DIAGNOSIS — M43.12 SPONDYLOLISTHESIS OF CERVICAL REGION: ICD-10-CM

## 2023-10-27 DIAGNOSIS — K50.813 CROHN'S DISEASE OF BOTH SMALL AND LARGE INTESTINE WITH FISTULA: ICD-10-CM

## 2023-10-27 DIAGNOSIS — E83.52 HYPERCALCEMIA: ICD-10-CM

## 2023-10-27 DIAGNOSIS — Z87.81 HISTORY OF CERVICAL FRACTURE: ICD-10-CM

## 2023-10-27 DIAGNOSIS — E78.5 HYPERLIPIDEMIA, UNSPECIFIED HYPERLIPIDEMIA TYPE: ICD-10-CM

## 2023-10-27 DIAGNOSIS — E03.9 HYPOTHYROIDISM, UNSPECIFIED TYPE: ICD-10-CM

## 2023-10-27 DIAGNOSIS — J44.9 CHRONIC OBSTRUCTIVE PULMONARY DISEASE, UNSPECIFIED COPD TYPE: ICD-10-CM

## 2023-10-27 DIAGNOSIS — F17.210 SMOKING GREATER THAN 20 PACK YEARS: ICD-10-CM

## 2023-10-27 DIAGNOSIS — Z00.00 ANNUAL PHYSICAL EXAM: Primary | ICD-10-CM

## 2023-10-27 DIAGNOSIS — N18.1 CHRONIC RENAL IMPAIRMENT, STAGE 1: ICD-10-CM

## 2023-10-27 DIAGNOSIS — E55.9 VITAMIN D DEFICIENCY: ICD-10-CM

## 2023-10-27 PROCEDURE — 99396 PREV VISIT EST AGE 40-64: CPT | Performed by: NURSE PRACTITIONER

## 2023-10-27 PROCEDURE — 1160F RVW MEDS BY RX/DR IN RCRD: CPT | Performed by: NURSE PRACTITIONER

## 2023-10-27 PROCEDURE — 1159F MED LIST DOCD IN RCRD: CPT | Performed by: NURSE PRACTITIONER

## 2023-10-27 RX ORDER — VARENICLINE TARTRATE 0.5 MG/1
TABLET, FILM COATED ORAL
Qty: 13 TABLET | Refills: 0 | Status: SHIPPED | OUTPATIENT
Start: 2023-10-27 | End: 2023-11-04

## 2023-10-27 RX ORDER — METHOCARBAMOL 750 MG/1
750 TABLET, FILM COATED ORAL 2 TIMES DAILY PRN
Qty: 40 TABLET | Refills: 0 | Status: SHIPPED | OUTPATIENT
Start: 2023-10-27

## 2023-10-27 RX ORDER — LIDOCAINE 5 %
ADHESIVE PATCH, MEDICATED TOPICAL
Qty: 30 PATCH | Refills: 5 | Status: SHIPPED | OUTPATIENT
Start: 2023-10-27

## 2023-10-27 RX ORDER — GABAPENTIN 300 MG/1
300 CAPSULE ORAL 3 TIMES DAILY PRN
Qty: 90 CAPSULE | Refills: 0 | Status: SHIPPED | OUTPATIENT
Start: 2023-10-27

## 2023-10-27 NOTE — PROGRESS NOTES
Female Physical Note      Date: 10/27/2023   Patient Name: Elena Ma  : 1960   MRN: [id number]     Chief Complaint:    Chief Complaint   Patient presents with    Annual Exam       History of Present Illness: Elena Ma is a 63 y.o. female who is here today for their annual health maintenance and physical.        Subjective      Review of Systems:   Review of Systems   Musculoskeletal:  Positive for arthralgias.   All other systems reviewed and are negative.      Past Medical History, Social History, Family History and Care Team were all reviewed with patient and updated as appropriate.     Medications:     Current Outpatient Medications:     gabapentin (NEURONTIN) 300 MG capsule, Take 1 capsule by mouth 3 (Three) Times a Day As Needed (neuropathc pain)., Disp: 90 capsule, Rfl: 0    Lidoderm 5 %, Remove & Discard patch within 12 hours or as directed by MD, Disp: 30 patch, Rfl: 5    methocarbamol (ROBAXIN) 750 MG tablet, Take 1 tablet by mouth 2 (Two) Times a Day As Needed for Muscle Spasms., Disp: 40 tablet, Rfl: 0    albuterol sulfate  (90 Base) MCG/ACT inhaler, Inhale 2 puffs Every 4 (Four) Hours As Needed for Wheezing., Disp: 18 g, Rfl: 5    alendronate (FOSAMAX) 70 MG tablet, Take 1 tablet by mouth Every 7 (Seven) Days., Disp: 12 tablet, Rfl: 1    Anoro Ellipta 62.5-25 MCG/ACT aerosol powder  inhaler, Inhale 1 puff by mouth once daily, Disp: 180 each, Rfl: 0    Cholecalciferol (vitamin D3) 125 MCG (5000 UT) tablet tablet, Take 1 tablet by mouth Daily., Disp: 90 tablet, Rfl: 1    Diclofenac Sodium (VOLTAREN EX), Apply  topically., Disp: , Rfl:     ibuprofen (ADVIL,MOTRIN) 800 MG tablet, Take 1 tablet by mouth 3 (Three) Times a Day As Needed., Disp: , Rfl:     levothyroxine (SYNTHROID, LEVOTHROID) 137 MCG tablet, Take 1 tablet by mouth Daily., Disp: 30 tablet, Rfl: 1    Magnesium 250 MG tablet, Take 1 tablet by mouth Daily., Disp: , Rfl:     Omeprazole 20 MG tablet delayed-release, Take  20 mg by mouth Daily., Disp: 90 each, Rfl: 1    ondansetron (ZOFRAN) 4 MG tablet, Take 1 tablet by mouth As Needed for Nausea or Vomiting., Disp: , Rfl:     Potassium 99 MG tablet, Take 1,000 mcg by mouth Daily., Disp: , Rfl:     traZODone (DESYREL) 50 MG tablet, Take 1 tablet by mouth At Night As Needed for Sleep., Disp: 90 tablet, Rfl: 1    valACYclovir (VALTREX) 500 MG tablet, Take 1 tablet by mouth Daily., Disp: 90 tablet, Rfl: 1    varenicline (Chantix) 0.5 MG tablet, Take 1 tablet by mouth Daily for 3 days, THEN 1 tablet 2 (Two) Times a Day for 5 days., Disp: 13 tablet, Rfl: 0    Allergies:   Allergies   Allergen Reactions    Codeine GI Intolerance    Hydrocodone Other (See Comments)     nausea    Levofloxacin Rash    Zithromax [Azithromycin] Itching       Immunizations:  Immunization History   Administered Date(s) Administered    COVID-19 (SIDDHARTHA) 03/16/2021, 11/02/2021    COVID-19 (MODERNA) BIVALENT 12+YRS 12/13/2022    Pneumococcal Polysaccharide (PPSV23) 01/11/2018    Tdap 03/22/2023       Colorectal Screening: Crohn's disease, Hx colectomy, Ileoscopy through stoma attempted March 2023, stenosed ileostomy which prohibited passage of the scope. Suppose to follow up GI. Will call and schedule with Dr. Brittany Quan at [facility].     Pap:  Up-To-Date   Last Completed Pap Smear            PAP SMEAR (Every 3 Years) Next due on 12/13/2025 12/13/2022  Done                   Mammogram:  Up-To-Date, next scheduled for November 21  Last Completed Mammogram            Scheduled - MAMMOGRAM (Every 2 Years) Scheduled for 11/21/2023 11/16/2022  Mammo Screening Digital Tomosynthesis Bilateral With CAD    12/11/2020  Mammo Screening Digital Tomosynthesis Bilateral With CAD    09/06/2019  Mammo Screening Digital Tomosynthesis Bilateral With CAD    04/11/2014  MAMMOGRAPHY SCREENING BILATERAL                     CT for Smoker (Age 50-80, 20 pk yr):  Oct 2022, order placed   Bone Density/DEXA (Age 65 or high  "risk): scheduled for November 21  Hep C (Age 18-79 once):  previously negative  A1c: utd  Lipid panel: utd  Ophthalmologist: established, going to schedule [facility]  Dentist: established, [facility]    Tobacco Use: High Risk (10/27/2023)    Patient History     Smoking Tobacco Use: Every Day     Smokeless Tobacco Use: Never     Passive Exposure: Not on file       Social History     Substance and Sexual Activity   Alcohol Use Yes    Alcohol/week: 2.0 standard drinks of alcohol    Types: 2 Drinks containing 0.5 oz of alcohol per week        Social History     Substance and Sexual Activity   Drug Use Never        Diet/Physical activity: counseled on 10/27/23      Sexual Health: no concerns   Menopause: postmenopausal    Objective     Physical Exam:  Vital Signs:   Vitals:    10/27/23 1329   BP: 98/64   Pulse: 56   Temp: 97.6 °F (36.4 °C)   SpO2: 95%   Weight: 40.8 kg (90 lb)   Height: 154.9 cm (60.98\")   PainSc:   5   PainLoc: Shoulder     Body mass index is 17.02 kg/m².     Physical Exam  Vitals and nursing note reviewed.   Constitutional:       General: She is not in acute distress.     Appearance: Normal appearance. She is underweight.   HENT:      Head: Normocephalic and atraumatic.      Right Ear: Tympanic membrane, ear canal and external ear normal.      Left Ear: Tympanic membrane, ear canal and external ear normal.      Nose: Nose normal.      Mouth/Throat:      Mouth: Mucous membranes are moist.      Pharynx: Oropharynx is clear.   Eyes:      General: No scleral icterus.     Extraocular Movements: Extraocular movements intact.      Conjunctiva/sclera: Conjunctivae normal.      Pupils: Pupils are equal, round, and reactive to light.   Neck:      Thyroid: No thyromegaly.   Cardiovascular:      Rate and Rhythm: Normal rate and regular rhythm.      Heart sounds: Normal heart sounds.   Pulmonary:      Effort: Pulmonary effort is normal.      Breath sounds: Normal breath sounds.   Abdominal:      General: Abdomen is " flat. Bowel sounds are normal. There is no distension.      Palpations: Abdomen is soft.      Tenderness: There is no abdominal tenderness. There is no guarding.   Genitourinary:     Comments: Defer pelvic exam  Musculoskeletal:         General: Normal range of motion.      Cervical back: Normal range of motion and neck supple.   Lymphadenopathy:      Cervical: No cervical adenopathy.   Skin:     General: Skin is warm and dry.      Coloration: Skin is not jaundiced or pale.      Findings: No rash.   Neurological:      Mental Status: She is alert and oriented to person, place, and time.      Cranial Nerves: No cranial nerve deficit.      Motor: No weakness.      Coordination: Coordination normal.      Gait: Gait normal.   Psychiatric:         Mood and Affect: Mood normal.         Behavior: Behavior normal.         Thought Content: Thought content normal.         Assessment / Plan      Assessment/Plan:   Problem List Items Addressed This Visit    None  Visit Diagnoses       Annual physical exam    -  Primary    Hypothyroidism, unspecified type        Chronic renal impairment, stage 1        Chronic obstructive pulmonary disease, unspecified COPD type        Hyperlipidemia, unspecified hyperlipidemia type        Vitamin D deficiency        Age-related osteoporosis without current pathological fracture        Crohn's disease of both small and large intestine with fistula        Spondylolisthesis of cervical region        Relevant Medications    gabapentin (NEURONTIN) 300 MG capsule    History of cervical fracture        Relevant Medications    gabapentin (NEURONTIN) 300 MG capsule    Arthralgia of left shoulder region        Relevant Medications    methocarbamol (ROBAXIN) 750 MG tablet    Hypercalcemia        Relevant Orders    PTH, Intact & Calcium    Smoking greater than 20 pack years        Relevant Orders    CT Chest Low Dose Wo            Healthcare Maintenance:  Counseling provided based on age appropriate USPSTF  guidelines.  Health Maintenance screenings and vaccinations updated, encouraged  Declines vaccines   Chronic conditions stable at this time   Update TSH since decreasing dose, recheck calcium   Mammogram; ordered   Pap smear; UTD  Colon cancer screening: due, will follow up [facility] Dr. [facility]    DEXA scan: scheduled   CT lung cancer screening: ordered   Encouraged 150 minutes of exercise total per week for heart health   Recommend balanced diet, portion control, limiting excess carbs and sweets, limit caffeine   Dental visits twice per year, yearly eye exams   Vitamin D 5000 IU daily       Elena Ma voices understanding and acceptance of this advice and will call back with any further questions or concerns. AVS with preventive healthcare tips printed for patient.         Follow Up:   Return in about 6 months (around 4/27/2024).      DORINA Mcrae  [facility]

## 2023-10-30 DIAGNOSIS — E03.9 HYPOTHYROIDISM, UNSPECIFIED TYPE: ICD-10-CM

## 2023-10-30 RX ORDER — LEVOTHYROXINE SODIUM 0.15 MG/1
150 TABLET ORAL DAILY
Qty: 90 TABLET | Refills: 0 | Status: SHIPPED | OUTPATIENT
Start: 2023-10-30

## 2023-12-04 ENCOUNTER — OFFICE VISIT (OUTPATIENT)
Dept: INTERNAL MEDICINE | Facility: CLINIC | Age: 63
End: 2023-12-04
Payer: COMMERCIAL

## 2023-12-04 VITALS
TEMPERATURE: 98.7 F | DIASTOLIC BLOOD PRESSURE: 66 MMHG | HEART RATE: 86 BPM | HEIGHT: 61 IN | WEIGHT: 94 LBS | OXYGEN SATURATION: 97 % | SYSTOLIC BLOOD PRESSURE: 104 MMHG | BODY MASS INDEX: 17.75 KG/M2

## 2023-12-04 DIAGNOSIS — N30.01 ACUTE CYSTITIS WITH HEMATURIA: Primary | ICD-10-CM

## 2023-12-04 DIAGNOSIS — Z74.09 IMPAIRED MOBILITY AND ADLS: ICD-10-CM

## 2023-12-04 DIAGNOSIS — B37.9 ANTIBIOTIC-INDUCED YEAST INFECTION: ICD-10-CM

## 2023-12-04 DIAGNOSIS — T36.95XA ANTIBIOTIC-INDUCED YEAST INFECTION: ICD-10-CM

## 2023-12-04 DIAGNOSIS — R53.1 WEAKNESS: ICD-10-CM

## 2023-12-04 DIAGNOSIS — Z78.9 IMPAIRED MOBILITY AND ADLS: ICD-10-CM

## 2023-12-04 DIAGNOSIS — J01.40 ACUTE NON-RECURRENT PANSINUSITIS: ICD-10-CM

## 2023-12-04 DIAGNOSIS — Z91.81 RISK FOR FALLS: ICD-10-CM

## 2023-12-04 LAB
BILIRUB BLD-MCNC: NEGATIVE MG/DL
CLARITY, POC: CLEAR
COLOR UR: YELLOW
EXPIRATION DATE: ABNORMAL
EXPIRATION DATE: NORMAL
EXPIRATION DATE: NORMAL
FLUAV AG UPPER RESP QL IA.RAPID: NOT DETECTED
FLUBV AG UPPER RESP QL IA.RAPID: NOT DETECTED
GLUCOSE UR STRIP-MCNC: NEGATIVE MG/DL
INTERNAL CONTROL: NORMAL
INTERNAL CONTROL: NORMAL
KETONES UR QL: NEGATIVE
LEUKOCYTE EST, POC: ABNORMAL
Lab: ABNORMAL
Lab: NORMAL
Lab: NORMAL
NITRITE UR-MCNC: NEGATIVE MG/ML
PH UR: 6 [PH] (ref 5–8)
PROT UR STRIP-MCNC: ABNORMAL MG/DL
RBC # UR STRIP: ABNORMAL /UL
S PYO AG THROAT QL: NEGATIVE
SARS-COV-2 AG UPPER RESP QL IA.RAPID: NOT DETECTED
SP GR UR: 1.03 (ref 1–1.03)
UROBILINOGEN UR QL: NORMAL

## 2023-12-04 RX ORDER — FLUCONAZOLE 150 MG/1
TABLET ORAL
Qty: 2 TABLET | Refills: 0 | Status: SHIPPED | OUTPATIENT
Start: 2023-12-04

## 2023-12-04 RX ORDER — CEFUROXIME AXETIL 500 MG/1
500 TABLET ORAL 2 TIMES DAILY
Qty: 14 TABLET | Refills: 0 | Status: SHIPPED | OUTPATIENT
Start: 2023-12-04 | End: 2023-12-11

## 2023-12-04 NOTE — PROGRESS NOTES
Acute Office Visit      Date: 2023   Patient Name: Elena Ma  : 1960   MRN: [id number]     Chief Complaint:    Chief Complaint   Patient presents with    Headache     X Friday    Cough    Ear Fullness    Back Pain     LBP X Friday    urine color     orange    Chills    fever    Sore Throat       History of Present Illness:   63-year-old female presents today for headache, cough, ear fullness, chills, fever up to 100.5 degrees Fahrenheit, sore throat and UTI symptoms including dark urine color/concentration, bilateral low back pain since Friday, 2022. She denies dysuria, frequency, urgency, inability to urinate, abdominal pain. She does have decreased urine stream. She is trying to drink a lot of water and avoid caffeine.   No sick contacts.   She admits that it is hard for her to care for herself, especially shower when she is sick due to increased weakness. She use to have a shower chair that helped her due to weakness and inability to stand for long periods to decrease her risk for falls but this was lost in her recent move.     Subjective      Review of Systems:   Pertinent ROS noted in HPI.     I have reviewed the patients family history, social history, past medical history, past surgical history and have updated it as appropriate.     Medications:     Current Outpatient Medications:     albuterol sulfate  (90 Base) MCG/ACT inhaler, Inhale 2 puffs Every 4 (Four) Hours As Needed for Wheezing., Disp: 18 g, Rfl: 5    alendronate (FOSAMAX) 70 MG tablet, Take 1 tablet by mouth Every 7 (Seven) Days., Disp: 12 tablet, Rfl: 1    Anoro Ellipta 62.5-25 MCG/ACT aerosol powder  inhaler, Inhale 1 puff by mouth once daily, Disp: 180 each, Rfl: 0    cefuroxime (CEFTIN) 500 MG tablet, Take 1 tablet by mouth 2 (Two) Times a Day for 7 days., Disp: 14 tablet, Rfl: 0    Cholecalciferol (vitamin D3) 125 MCG (5000 UT) tablet tablet, Take 1 tablet by mouth Daily., Disp: 90 tablet, Rfl: 1     Diclofenac Sodium (VOLTAREN EX), Apply  topically., Disp: , Rfl:     fluconazole (Diflucan) 150 MG tablet, Take 1 tablet by mouth now, take additional dose in 72 hours if symptoms persist, Disp: 2 tablet, Rfl: 0    gabapentin (NEURONTIN) 300 MG capsule, Take 1 capsule by mouth 3 (Three) Times a Day As Needed (neuropathc pain)., Disp: 90 capsule, Rfl: 0    ibuprofen (ADVIL,MOTRIN) 800 MG tablet, Take 1 tablet by mouth 3 (Three) Times a Day As Needed., Disp: , Rfl:     levothyroxine (SYNTHROID, LEVOTHROID) 150 MCG tablet, Take 1 tablet by mouth Daily., Disp: 90 tablet, Rfl: 0    Lidoderm 5 %, Remove & Discard patch within 12 hours or as directed by MD, Disp: 30 patch, Rfl: 5    Magnesium 250 MG tablet, Take 1 tablet by mouth Daily., Disp: , Rfl:     methocarbamol (ROBAXIN) 750 MG tablet, Take 1 tablet by mouth 2 (Two) Times a Day As Needed for Muscle Spasms., Disp: 40 tablet, Rfl: 0    Omeprazole 20 MG tablet delayed-release, Take 20 mg by mouth Daily., Disp: 90 each, Rfl: 1    ondansetron (ZOFRAN) 4 MG tablet, Take 1 tablet by mouth As Needed for Nausea or Vomiting., Disp: , Rfl:     Potassium 99 MG tablet, Take 1,000 mcg by mouth Daily., Disp: , Rfl:     traZODone (DESYREL) 50 MG tablet, Take 1 tablet by mouth At Night As Needed for Sleep., Disp: 90 tablet, Rfl: 1    valACYclovir (VALTREX) 500 MG tablet, Take 1 tablet by mouth Daily., Disp: 90 tablet, Rfl: 1    Allergies:   Allergies   Allergen Reactions    Codeine GI Intolerance    Hydrocodone Other (See Comments)     nausea    Levofloxacin Rash    Zithromax [Azithromycin] Itching       Objective     Physical Exam  Physical Exam  Vitals and nursing note reviewed.   Constitutional:       Appearance: Normal appearance. She is underweight.   HENT:      Right Ear: Ear canal and external ear normal. A middle ear effusion is present.      Left Ear: Ear canal and external ear normal. A middle ear effusion is present.      Nose: Congestion and rhinorrhea present.       "Right Sinus: Maxillary sinus tenderness and frontal sinus tenderness present.      Left Sinus: Maxillary sinus tenderness and frontal sinus tenderness present.      Mouth/Throat:      Lips: Pink.      Mouth: Mucous membranes are moist.      Pharynx: Oropharynx is clear. Uvula midline.   Eyes:      General: No scleral icterus.        Right eye: No discharge.         Left eye: No discharge.      Extraocular Movements: Extraocular movements intact.      Conjunctiva/sclera: Conjunctivae normal.      Pupils: Pupils are equal, round, and reactive to light.   Neck:      Trachea: Trachea and phonation normal.   Cardiovascular:      Rate and Rhythm: Normal rate and regular rhythm.   Pulmonary:      Effort: Pulmonary effort is normal.      Breath sounds: Normal breath sounds.   Abdominal:      General: Bowel sounds are normal.      Palpations: Abdomen is soft.      Tenderness: There is no right CVA tenderness or left CVA tenderness.   Musculoskeletal:         General: Normal range of motion.      Cervical back: Normal range of motion and neck supple.   Lymphadenopathy:      Cervical: No cervical adenopathy.   Skin:     General: Skin is warm and dry.      Capillary Refill: Capillary refill takes less than 2 seconds.      Findings: No rash.   Neurological:      Mental Status: She is alert and oriented to person, place, and time.      Motor: Weakness present.   Psychiatric:         Mood and Affect: Mood normal.         Behavior: Behavior normal.         Thought Content: Thought content normal.         Vital Signs:   Vitals:    12/04/23 1123   BP: 104/66   Pulse: 86   Temp: 98.7 °F (37.1 °C)   SpO2: 97%   Weight: 42.6 kg (94 lb)   Height: 154.9 cm (60.98\")     Body mass index is 17.77 kg/m².  BMI is below normal parameters (malnutrition). Recommendations: high calorie diet AVS       Assessment / Plan      Assessment/Plan:   Problem List Items Addressed This Visit    None  Visit Diagnoses       Acute cystitis with hematuria    -  " Primary    Relevant Medications    cefuroxime (CEFTIN) 500 MG tablet    Other Relevant Orders    POCT urinalysis dipstick, automated (Completed)    Urine Culture - Urine, Urine, Clean Catch    Acute non-recurrent pansinusitis        Relevant Medications    cefuroxime (CEFTIN) 500 MG tablet    Other Relevant Orders    POCT SARS-CoV-2 Antigen SHEELA + Flu (Completed)    POCT rapid strep A (Completed)    Antibiotic-induced yeast infection        Relevant Medications    fluconazole (Diflucan) 150 MG tablet    Impaired mobility and ADLs        Relevant Orders    Miscellaneous DME    Risk for falls        Relevant Orders    Miscellaneous DME    Weakness        Relevant Orders    Miscellaneous DME    Adult BMI <19 kg/sq m                  COVID-19, influenza, and strep are negative.   Patient has acute cystitis with hematuria in addition to sinus infection.   Initiate Ceftin twice a day x7 days to co-treat sinus infection as well as UTI.   She will follow up if symptoms persist or worsen.  Urine culture to ensure on correct antimicrobial.  She is instructed to push lots of water, avoid caffeinated carbonated beverages, and wipe front to back after voids.   Patient requests diflucan, take at first onset of vaginal yeast infection symptoms, repeat dose 72 hours if symptoms persist.   ER if any severe pain, inability to urinate, high fever, urine cola colored.   Patient needs assistance with ADLs grooming/showering due to weakness and inability to stand to shower long periods and risk for falls. Ordered shower chair as this will assist patient in grooming and decrease risk for falls. Consider PT and therapy. Eat high calorie diet to gain weight.     Follow Up:   Return if symptoms worsen or fail to improve.      Jenifer CAM  [facility]    Transcribed from ambient dictation for DORINA Kinney by Justin Chairez.  12/04/23   14:09 EST    Patient or patient representative  verbalized consent to the visit recording.  I have personally performed the services described in this document as transcribed by the above individual, and it is both accurate and complete.

## 2023-12-06 LAB
BACTERIA UR CULT: NORMAL
BACTERIA UR CULT: NORMAL

## 2023-12-06 NOTE — PROGRESS NOTES
Urine culture is negative  Continue antibiotics as she had a sinus infection also during visit   Schedule follow up 1 week urine recheck   If still having blood In urine and back pain need to order CT

## 2023-12-11 ENCOUNTER — HOSPITAL ENCOUNTER (OUTPATIENT)
Dept: CT IMAGING | Facility: HOSPITAL | Age: 63
Discharge: HOME | End: 2023-12-11
Payer: COMMERCIAL

## 2023-12-11 ENCOUNTER — OFFICE VISIT (OUTPATIENT)
Dept: INTERNAL MEDICINE | Facility: CLINIC | Age: 63
End: 2023-12-11
Payer: COMMERCIAL

## 2023-12-11 ENCOUNTER — HOSPITAL ENCOUNTER (OUTPATIENT)
Dept: GENERAL RADIOLOGY | Facility: HOSPITAL | Age: 63
Discharge: HOME | End: 2023-12-11
Payer: COMMERCIAL

## 2023-12-11 ENCOUNTER — PATIENT MESSAGE (OUTPATIENT)
Dept: INTERNAL MEDICINE | Facility: CLINIC | Age: 63
End: 2023-12-11
Payer: COMMERCIAL

## 2023-12-11 VITALS
OXYGEN SATURATION: 96 % | SYSTOLIC BLOOD PRESSURE: 118 MMHG | TEMPERATURE: 97.8 F | HEART RATE: 83 BPM | WEIGHT: 96 LBS | BODY MASS INDEX: 18.12 KG/M2 | HEIGHT: 61 IN | DIASTOLIC BLOOD PRESSURE: 60 MMHG

## 2023-12-11 DIAGNOSIS — R93.5 ABNORMAL CT OF THE ABDOMEN: Primary | ICD-10-CM

## 2023-12-11 DIAGNOSIS — R31.0 GROSS HEMATURIA: Primary | ICD-10-CM

## 2023-12-11 DIAGNOSIS — R34 DECREASED URINATION: ICD-10-CM

## 2023-12-11 DIAGNOSIS — M54.50 ACUTE BILATERAL LOW BACK PAIN WITHOUT SCIATICA: ICD-10-CM

## 2023-12-11 DIAGNOSIS — J40 BRONCHITIS: Primary | ICD-10-CM

## 2023-12-11 DIAGNOSIS — R31.0 GROSS HEMATURIA: ICD-10-CM

## 2023-12-11 DIAGNOSIS — R05.1 ACUTE COUGH: ICD-10-CM

## 2023-12-11 LAB
BILIRUB BLD-MCNC: NEGATIVE MG/DL
CLARITY, POC: CLEAR
COLOR UR: YELLOW
EXPIRATION DATE: ABNORMAL
GLUCOSE UR STRIP-MCNC: NEGATIVE MG/DL
KETONES UR QL: NEGATIVE
LEUKOCYTE EST, POC: NEGATIVE
Lab: ABNORMAL
NITRITE UR-MCNC: NEGATIVE MG/ML
PH UR: 6 [PH] (ref 5–8)
PROT UR STRIP-MCNC: ABNORMAL MG/DL
RBC # UR STRIP: ABNORMAL /UL
SP GR UR: 1.03 (ref 1–1.03)
UROBILINOGEN UR QL: NORMAL

## 2023-12-11 PROCEDURE — 74176 CT ABD & PELVIS W/O CONTRAST: CPT

## 2023-12-11 PROCEDURE — 71046 X-RAY EXAM CHEST 2 VIEWS: CPT

## 2023-12-11 RX ORDER — METHYLPREDNISOLONE 4 MG/1
TABLET ORAL
Qty: 21 TABLET | Refills: 0 | Status: SHIPPED | OUTPATIENT
Start: 2023-12-11

## 2023-12-11 RX ORDER — BENZONATATE 100 MG/1
100 CAPSULE ORAL 3 TIMES DAILY PRN
Qty: 30 CAPSULE | Refills: 1 | Status: SHIPPED | OUTPATIENT
Start: 2023-12-11

## 2023-12-11 NOTE — PROGRESS NOTES
"  Office Visit      Patient Name: Elena Ma  : 1960   MRN: [id number]   Care Team: Patient Care Team:  Jenifer Scherer APRN as PCP - General (Family Medicine)  Alphonso Herrera MD as Consulting Physician (Neurosurgery)  Merly Young MD as Referring Physician (Internal Medicine)    Chief Complaint  Follow-up (Urine check)    Subjective     Subjective      Elena Ma, 63-year-old female, presents today to follow up on hematuria. Treated for UTI with Ceftin but urine culture came back negative. She denies fever, myalgia, chills, abdominal pain, suprapubic pain, dysuria, urgency. Endorses urinary frequency, decreased urine stream. She has bilateral low back pain, left greater than right, that has been ongoing for the past week. She continues to have 3+ blood in her urine. She does have a history of kidney stones. The patient overall does not feel well.     Treated for sinus infection last visit, but a cough has developed. She feels like it has moved down to her chest.     Objective     Objective   Vital Signs:   /60   Pulse 83   Temp 97.8 °F (36.6 °C)   Ht 154.9 cm (60.98\")   Wt 43.5 kg (96 lb)   SpO2 96%   BMI 18.15 kg/m²         Physical Exam  Vitals and nursing note reviewed.   Constitutional:       General: She is not in acute distress.     Appearance: Normal appearance. She is underweight.   HENT:      Right Ear: Ear canal and external ear normal. A middle ear effusion is present. Tympanic membrane is bulging.      Left Ear: Tympanic membrane, ear canal and external ear normal.      Nose: Nose normal. No congestion or rhinorrhea.      Right Sinus: No maxillary sinus tenderness or frontal sinus tenderness.      Left Sinus: No maxillary sinus tenderness or frontal sinus tenderness.      Mouth/Throat:      Lips: Pink.      Mouth: Mucous membranes are moist.      Pharynx: Oropharynx is clear. Uvula midline.   Eyes:      General: No scleral icterus.     Extraocular Movements: " Extraocular movements intact.      Conjunctiva/sclera: Conjunctivae normal.      Pupils: Pupils are equal, round, and reactive to light.   Neck:      Trachea: Phonation normal.   Cardiovascular:      Rate and Rhythm: Normal rate and regular rhythm.   Pulmonary:      Effort: Pulmonary effort is normal. No respiratory distress.      Breath sounds: Normal breath sounds. No wheezing or rhonchi.      Comments: Decreased breath sounds throughout   Abdominal:      General: Abdomen is flat. Bowel sounds are normal.      Palpations: Abdomen is soft.      Tenderness: There is no abdominal tenderness. There is no right CVA tenderness or left CVA tenderness.   Musculoskeletal:         General: Normal range of motion.      Cervical back: Normal range of motion and neck supple.   Lymphadenopathy:      Cervical: No cervical adenopathy.   Skin:     General: Skin is warm and dry.      Capillary Refill: Capillary refill takes less than 2 seconds.   Neurological:      Mental Status: She is alert and oriented to person, place, and time.   Psychiatric:         Mood and Affect: Mood normal.         Behavior: Behavior normal.         Thought Content: Thought content normal.          Labs:   Results for orders placed or performed in visit on 12/11/23   POC Urinalysis Dipstick, Automated    Specimen: Urine   Result Value Ref Range    Color Yellow Yellow, Straw, Dark Yellow, Latoya    Clarity, UA Clear Clear    Specific Gravity  1.030 1.005 - 1.030    pH, Urine 6.0 5.0 - 8.0    Leukocytes Negative Negative    Nitrite, UA Negative Negative    Protein, POC Trace (A) Negative mg/dL    Glucose, UA Negative Negative mg/dL    Ketones, UA Negative Negative    Urobilinogen, UA Normal Normal, 0.2 E.U./dL    Bilirubin Negative Negative    Blood, UA 3+ (A) Negative    Lot Number [id number]     Expiration Date 1/14/25      Assessment / Plan      Assessment & Plan   Problem List Items Addressed This Visit    None  Visit Diagnoses       Gross hematuria     -  Primary    Relevant Orders    POC Urinalysis Dipstick, Automated (Completed)    CT Abdomen Pelvis Stone Protocol (Completed)    Comprehensive Metabolic Panel    CBC No Differential    Urine Culture - Urine, Urine, Clean Catch    Acute bilateral low back pain without sciatica        Relevant Orders    POC Urinalysis Dipstick, Automated (Completed)    CT Abdomen Pelvis Stone Protocol (Completed)    Urine Culture - Urine, Urine, Clean Catch    Acute cough        Relevant Orders    XR Chest PA & Lateral (Completed)          UA continues to have 3+ blood. Patient has back pain with decreased urination. CT of abdomen and pelvis ordered stat today. Resend urine culture, order CBC, CMP stat and chest x-ray stat to further assess decreased breath sounds to r/o pneumonia. If pneumonia is present.      Follow Up   Return if symptoms worsen or fail to improve.  Patient was given instructions and counseling regarding her condition or for health maintenance advice. Please see specific information pulled into the AVS if appropriate.     DORINA Mcrae  [facility]    Transcribed from ambient dictation for DORINA Kinney by Elke Gotti.  12/11/23   17:27 EST    Patient or patient representative verbalized consent to the visit recording.  I have personally performed the services described in this document as transcribed by the above individual, and it is both accurate and complete.

## 2023-12-11 NOTE — PROGRESS NOTES
No stone or acute findings to kidney or bladder.I am referring her to urology for further evaluation of the blood in her urine, will be contacted to schedule.   There is a moderate amount of material in posterior pelvis, states likely bowel loops but need further imaging with oral and IV contrast to determine what it is so I am going to order this. Will be contacted to schedule later this week. Needs follow up with [facility] when able to get in   Chest XR shows bronchitis- prescribing her a steroid back, albuterol inhaler and cough medication to help with her symptoms. No antibiotics required, no pneumonia detected.

## 2023-12-12 ENCOUNTER — TELEPHONE (OUTPATIENT)
Dept: INTERNAL MEDICINE | Facility: CLINIC | Age: 63
End: 2023-12-12
Payer: COMMERCIAL

## 2023-12-12 NOTE — TELEPHONE ENCOUNTER
Spoke with Pt on the phone in regards to letter, states she spoke with her Landlord and was advised a letter wouldn't make a difference that they wouldn't give her more time.

## 2023-12-12 NOTE — TELEPHONE ENCOUNTER
Caller: Elena Ma    Relationship: Self    Best call back number: [phone]    Any additional details: SAW SOMETHING IN HER CHART ABOUT BEING REFERRED TO A GI DOCTOR. ASKING CARLENE TO PLEASE CALL TO DISCUSS.

## 2023-12-12 NOTE — TELEPHONE ENCOUNTER
Spoke with Pt in regards to urology referral, advised her Oscar is a nephrologist and Jenifer wants her to see a urologist. Advised her they will contact her to set up an appointment.    Pt stated her previous GI is no longer at [facility] and needs a new one so she contact Brittany Quan's office and they are unable to see her until April. Pt wants to know if that's ok or should she be seen sooner?

## 2023-12-12 NOTE — TELEPHONE ENCOUNTER
Caller: Elena Ma    Relationship: Self    Best call back number: [phone]     What is the medical concern/diagnosis: BLOOD IN URINE    What specialty or service is being requested: UROLOGY    What is the provider, practice or medical service name: Oscar Rodriguez MD    What is the office location: [address] [place] [address] [place] [address]    What is the office phone number: [phone]    Any additional details: PLEASE SEND REFERRAL TO DR AGUIRRE. WOULD LIKE [facility] IF HE HAS AN OFFICE THERE.    CAN NOT DO 12/14/23 DUE TO OTHER TESTING.. PREFERS MID MORNINGS.     ASKING THAT CARLENE CALL PATIENT.

## 2023-12-12 NOTE — TELEPHONE ENCOUNTER
That's ok for now just want to make sure she is scheduled to see someone ill let her know if needs to be sooner

## 2023-12-13 ENCOUNTER — PATIENT MESSAGE (OUTPATIENT)
Dept: INTERNAL MEDICINE | Facility: CLINIC | Age: 63
End: 2023-12-13
Payer: COMMERCIAL

## 2023-12-13 LAB
ALBUMIN SERPL-MCNC: 4.9 G/DL (ref 3.5–5.2)
ALBUMIN/GLOB SERPL: 2 G/DL
ALP SERPL-CCNC: 97 U/L (ref 39–117)
ALT SERPL-CCNC: 27 U/L (ref 1–33)
AST SERPL-CCNC: 29 U/L (ref 1–32)
BACTERIA UR CULT: NO GROWTH
BACTERIA UR CULT: NORMAL
BILIRUB SERPL-MCNC: 0.3 MG/DL (ref 0–1.2)
BUN SERPL-MCNC: 15 MG/DL (ref 8–23)
BUN/CREAT SERPL: 14.4 (ref 7–25)
CALCIUM SERPL-MCNC: 10 MG/DL (ref 8.6–10.5)
CHLORIDE SERPL-SCNC: 98 MMOL/L (ref 98–107)
CO2 SERPL-SCNC: 25.6 MMOL/L (ref 22–29)
CREAT SERPL-MCNC: 1.04 MG/DL (ref 0.57–1)
EGFRCR SERPLBLD CKD-EPI 2021: 60.1 ML/MIN/1.73
ERYTHROCYTE [DISTWIDTH] IN BLOOD BY AUTOMATED COUNT: 14.4 % (ref 12.3–15.4)
GLOBULIN SER CALC-MCNC: 2.5 GM/DL
GLUCOSE SERPL-MCNC: 90 MG/DL (ref 65–99)
HCT VFR BLD AUTO: 43.4 % (ref 34–46.6)
HGB BLD-MCNC: 14.8 G/DL (ref 12–15.9)
MCH RBC QN AUTO: 32.1 PG (ref 26.6–33)
MCHC RBC AUTO-ENTMCNC: 34.1 G/DL (ref 31.5–35.7)
MCV RBC AUTO: 94.1 FL (ref 79–97)
PLATELET # BLD AUTO: 271 10*3/MM3 (ref 140–450)
POTASSIUM SERPL-SCNC: 4.5 MMOL/L (ref 3.5–5.2)
PROT SERPL-MCNC: 7.4 G/DL (ref 6–8.5)
RBC # BLD AUTO: 4.61 10*6/MM3 (ref 3.77–5.28)
SODIUM SERPL-SCNC: 136 MMOL/L (ref 136–145)
WBC # BLD AUTO: 10.94 10*3/MM3 (ref 3.4–10.8)

## 2023-12-13 NOTE — TELEPHONE ENCOUNTER
From: Elena Ma  To: Jenifer Scherer  Sent: 12/13/2023 8:40 AM EST  Subject: Update     My fever is back it started last night 100.6 was the highest it got.

## 2023-12-13 NOTE — TELEPHONE ENCOUNTER
I called to speak with patient she asked if seeing her GI specialist in April 2024 is ok? If needing to see someone sooner would you mind to refer to another GI specialist? Patient is not having any trouble with her bowels.

## 2023-12-18 ENCOUNTER — TELEPHONE (OUTPATIENT)
Dept: UROLOGY | Facility: CLINIC | Age: 63
End: 2023-12-18

## 2023-12-18 NOTE — TELEPHONE ENCOUNTER
Provider: DEJA COLIN    Caller: GOPAL ESQUIVEL    Relationship to Patient: SELF    Phone Number: [phone]    Reason for Call: PT HAS NEW PT APPT ON 12-20-23- PT'S DX WAS GROSS HEMATURIA. PT WAS SCHEDULED TO HAVE A CT BUT THAT APPT GOT CHANGED TO 1-5-24. PT WAS GOING TO RESCHEDULE HER APPT BUT THE NEXT APPT IS NOT UNTIL FEBRUARY, DX IS AN URGENT DX. PLEASE ADVISE IF PT NEEDS TO RESCHEDULE HER APPT UNTIL AFTER THE CT OR IF SHE NEEDS TO BE SEEN AS SCHEDULED.     PLEASE GIVE PT A CALL BACK TO ADVISE.

## 2023-12-20 ENCOUNTER — OFFICE VISIT (OUTPATIENT)
Dept: UROLOGY | Facility: CLINIC | Age: 63
End: 2023-12-20
Payer: COMMERCIAL

## 2023-12-20 VITALS
WEIGHT: 96 LBS | DIASTOLIC BLOOD PRESSURE: 74 MMHG | SYSTOLIC BLOOD PRESSURE: 114 MMHG | BODY MASS INDEX: 18.12 KG/M2 | HEIGHT: 61 IN

## 2023-12-20 DIAGNOSIS — R31.29 OTHER MICROSCOPIC HEMATURIA: Primary | ICD-10-CM

## 2023-12-20 DIAGNOSIS — N39.41 URGE INCONTINENCE: ICD-10-CM

## 2023-12-20 LAB
BILIRUB BLD-MCNC: NEGATIVE MG/DL
CLARITY, POC: CLEAR
COLOR UR: ABNORMAL
EXPIRATION DATE: ABNORMAL
GLUCOSE UR STRIP-MCNC: NEGATIVE MG/DL
KETONES UR QL: NEGATIVE
LEUKOCYTE EST, POC: NEGATIVE
Lab: ABNORMAL
NITRITE UR-MCNC: NEGATIVE MG/ML
PH UR: 6 [PH] (ref 5–8)
PROT UR STRIP-MCNC: ABNORMAL MG/DL
RBC # UR STRIP: NEGATIVE /UL
SP GR UR: 1.02 (ref 1–1.03)
UROBILINOGEN UR QL: NORMAL

## 2023-12-20 RX ORDER — VIBEGRON 75 MG/1
75 TABLET, FILM COATED ORAL DAILY
Qty: 30 TABLET | Refills: 11 | Status: SHIPPED | OUTPATIENT
Start: 2023-12-20

## 2023-12-20 NOTE — PROGRESS NOTES
Chief Complaint  Microscopic hematuria    HPI  Ms. Ma is a 63 y.o. female with history of Crohn's disease who presents with microscopic hematuria. After thanksgiving she had a sinus infection and bronchitis, at the same time she had very dark orange to rust colored was cultured and was negative for a UTI but had 3+ blood on UA. She was treated for her sinus infection and is currently feeling better.  She also reports she has an infection in her gums she is getting this taken care of at her dentist.    Patient does no not have current hematuria.  Drinks 1 bottle of water daily     Has no seen urology before.    Has mixed incontinence. Tried anticholenergic medications and caused her to have SE blurry vision    Has no vaginal dryness     History of smoking?    yes  History of second-hand smoking exposure? yes  History of chemotherapy?   no  History of radiation?    no  History of kidney stones ?   yes  History of frequent urinary tract infections? no  History of abnormal pelvic/pap smear?  no  Takes blood thinners?    no    History of menopause yes  Vaginal bleeding/spotting no    She currently denies fevers, chills, nausea, vomiting, constipation or flank pain.        Past Medical History  Past Medical History:   Diagnosis Date    Arthritis     Back problem     Bronchitis     Cervical compression fracture 07/2022    treated conservatively with a brace x 5 weeks    COPD, moderate     Crohn disease     Disease of thyroid gland     H/O bladder infections     Hernia, internal     Osteoporosis     Pneumonia     Rotator cuff syndrome 7 2022    Scoliosis 7 2020    Thyroid disease        Past Surgical History  Past Surgical History:   Procedure Laterality Date    ECTOPIC PREGNANCY SURGERY      ILEOSTOMY      STOMACH SURGERY      VAGINA RECONSTRUCTION SURGERY         Medications    Current Outpatient Medications:     albuterol sulfate  (90 Base) MCG/ACT inhaler, Inhale 2 puffs Every 4 (Four) Hours As Needed for  Wheezing., Disp: 18 g, Rfl: 5    alendronate (FOSAMAX) 70 MG tablet, Take 1 tablet by mouth Every 7 (Seven) Days., Disp: 12 tablet, Rfl: 1    Anoro Ellipta 62.5-25 MCG/ACT aerosol powder  inhaler, Inhale 1 puff by mouth once daily, Disp: 180 each, Rfl: 0    benzonatate (Tessalon Perles) 100 MG capsule, Take 1 capsule by mouth 3 (Three) Times a Day As Needed for Cough., Disp: 30 capsule, Rfl: 1    Cholecalciferol (vitamin D3) 125 MCG (5000 UT) tablet tablet, Take 1 tablet by mouth Daily., Disp: 90 tablet, Rfl: 1    Diclofenac Sodium (VOLTAREN EX), Apply  topically., Disp: , Rfl:     fluconazole (Diflucan) 150 MG tablet, Take 1 tablet by mouth now, take additional dose in 72 hours if symptoms persist, Disp: 2 tablet, Rfl: 0    gabapentin (NEURONTIN) 300 MG capsule, Take 1 capsule by mouth 3 (Three) Times a Day As Needed (neuropathc pain)., Disp: 90 capsule, Rfl: 0    ibuprofen (ADVIL,MOTRIN) 800 MG tablet, Take 1 tablet by mouth 3 (Three) Times a Day As Needed., Disp: , Rfl:     levothyroxine (SYNTHROID, LEVOTHROID) 150 MCG tablet, Take 1 tablet by mouth Daily., Disp: 90 tablet, Rfl: 0    Lidoderm 5 %, Remove & Discard patch within 12 hours or as directed by MD, Disp: 30 patch, Rfl: 5    Magnesium 250 MG tablet, Take 1 tablet by mouth Daily., Disp: , Rfl:     methocarbamol (ROBAXIN) 750 MG tablet, Take 1 tablet by mouth 2 (Two) Times a Day As Needed for Muscle Spasms., Disp: 40 tablet, Rfl: 0    methylPREDNISolone (MEDROL) 4 MG dose pack, Take as directed on package instructions., Disp: 21 tablet, Rfl: 0    Omeprazole 20 MG tablet delayed-release, Take 20 mg by mouth Daily., Disp: 90 each, Rfl: 1    ondansetron (ZOFRAN) 4 MG tablet, Take 1 tablet by mouth As Needed for Nausea or Vomiting., Disp: , Rfl:     Potassium 99 MG tablet, Take 1,000 mcg by mouth Daily., Disp: , Rfl:     traZODone (DESYREL) 50 MG tablet, Take 1 tablet by mouth At Night As Needed for Sleep., Disp: 90 tablet, Rfl: 1    valACYclovir (VALTREX) 500  MG tablet, Take 1 tablet by mouth Daily., Disp: 90 tablet, Rfl: 1    Vibegron (Gemtesa) 75 MG tablet, Take 1 tablet by mouth Daily., Disp: 30 tablet, Rfl: 11    Allergies  Allergies   Allergen Reactions    Codeine GI Intolerance    Hydrocodone Other (See Comments)     nausea    Levofloxacin Rash    Zithromax [Azithromycin] Itching       Social History  Social History     Socioeconomic History    Marital status:    Tobacco Use    Smoking status: Every Day     Packs/day: 1.00     Years: 50.00     Additional pack years: 0.00     Total pack years: 50.00     Types: Cigarettes    Smokeless tobacco: Never   Vaping Use    Vaping Use: Never used   Substance and Sexual Activity    Alcohol use: Yes     Alcohol/week: 2.0 standard drinks of alcohol     Types: 2 Drinks containing 0.5 oz of alcohol per week    Drug use: Never    Sexual activity: Not Currently       Family History  She has no family history of bladder or kidney cancer  She has no family history of kidney stones    Physical Exam  Vitals:    12/20/23 0911   BP: 114/74        Labs  Brief Urine Lab Results  (Last result in the past 365 days)        Color   Clarity   Blood   Leuk Est   Nitrite   Protein   CREAT   Urine HCG        12/20/23 0915 Dark Yellow   Clear   Negative   Negative   Negative   Trace                     Chemistry        Component Value Date/Time     12/11/2023 1229     07/20/2022 1957    K 4.5 12/11/2023 1229    K 4.8 07/20/2022 1957    CL 98 12/11/2023 1229     07/20/2022 1957    CO2 25.6 12/11/2023 1229    CO2 22 07/20/2022 1957    BUN 15 12/11/2023 1229    BUN 13 07/20/2022 1957    CREATININE 1.04 (H) 12/11/2023 1229    CREATININE 1.04 07/20/2022 1957     (H) 07/20/2022 1957        Component Value Date/Time    CALCIUM 10.0 12/11/2023 1229    CALCIUM 10.3 (H) 07/20/2022 1957    ALKPHOS 97 12/11/2023 1229    ALKPHOS 67 07/20/2022 1957    AST 29 12/11/2023 1229    AST 21 07/20/2022 1957    ALT 27 12/11/2023 1229     "ALT 15 07/20/2022 1957    BILITOT 0.3 12/11/2023 1229    BILITOT 0.3 07/20/2022 1957            Lab Results   Component Value Date    WBC 10.94 (H) 12/11/2023    HGB 14.8 12/11/2023    HCT 43.4 12/11/2023    MCV 94.1 12/11/2023     12/11/2023       No results found for: \"URINECX\"    Radiologic Studies  CT Abdomen Pelvis Stone Protocol    Result Date: 12/11/2023  Right lower quadrant ileostomy with parastomal hernia.  Moderate amount of material of intermediate attenuation posterior pelvis, likely unopacified bowel loops; consider repeat study with IV and oral contrast for further evaluation.   CTDI: 2.88 mGy DLP:110.86 mGy.cm  This report was signed and finalized on 12/11/2023 1:33 PM by Darshana Deleon MD.      XR Chest PA & Lateral    Result Date: 12/11/2023  Hyperinflation and bronchitis without acute infiltrate.    This report was signed and finalized on 12/11/2023 1:28 PM by Darshaan Deleon MD.      MRI Shoulder Left Without Contrast    Result Date: 8/31/2023  1. No evidence of full-thickness supraspinatus tendon tear. 2. Small amount of fluid in subacromial/subdeltoid bursa, consistent with mild bursitis.  This report was signed and finalized on 8/31/2023 2:35 PM by Ger Bocanegra MD.          Assessment  63 y.o. female who presents with microscopic hematuria and mixed urinary incontinence.  This is a new diagnosis of uncertain prognosis.    Risk factors include smoking and age.  She has had a CT stone protocol which was negative for kidney stones and currently has CT with and with contrast scheduled    Plan  1.  Obtain urine microscopy today  2. Reevaluate for cystoscopy next visit per urine microscopy results  3.  Start Gemtesa 75 mg daily   4. Follow up with me in 8 weeks repeat UA and urine microscopy      Nirmal Peralta, DORINA   "

## 2023-12-21 LAB
APPEARANCE UR: CLEAR
BACTERIA #/AREA URNS HPF: NORMAL /HPF
BILIRUB UR QL STRIP: NEGATIVE
CASTS URNS MICRO: NORMAL
COLOR UR: YELLOW
EPI CELLS #/AREA URNS HPF: NORMAL /HPF
GLUCOSE UR QL STRIP: NEGATIVE
HGB UR QL STRIP: NEGATIVE
KETONES UR QL STRIP: NEGATIVE
LEUKOCYTE ESTERASE UR QL STRIP: NEGATIVE
NITRITE UR QL STRIP: NEGATIVE
PH UR STRIP: 5.5 [PH] (ref 5–8)
PROT UR QL STRIP: ABNORMAL
RBC #/AREA URNS HPF: NORMAL /HPF
SP GR UR STRIP: 1.02 (ref 1–1.03)
UROBILINOGEN UR STRIP-MCNC: ABNORMAL MG/DL
WBC #/AREA URNS HPF: NORMAL /HPF

## 2024-01-02 ENCOUNTER — TELEPHONE (OUTPATIENT)
Dept: INTERNAL MEDICINE | Facility: CLINIC | Age: 64
End: 2024-01-02
Payer: COMMERCIAL

## 2024-01-02 NOTE — TELEPHONE ENCOUNTER
Noris (calling from Magalys's line) with [employer] called and stated that [facility] denied the CT of abd/pelvis. A letter is in media.  Wants to know if Jenifer would like to do a peer to peer or cancel. Please advise. [phone]

## 2024-01-03 DIAGNOSIS — M81.0 AGE-RELATED OSTEOPOROSIS WITHOUT CURRENT PATHOLOGICAL FRACTURE: ICD-10-CM

## 2024-01-03 RX ORDER — ALENDRONATE SODIUM 70 MG/1
70 TABLET ORAL WEEKLY
Qty: 12 TABLET | Refills: 0 | Status: SHIPPED | OUTPATIENT
Start: 2024-01-03

## 2024-01-04 NOTE — TELEPHONE ENCOUNTER
Karey Steven PA-C To  Frank Simmons RN Sent  9/17/2018  1:23 PM   Trazodone 50 mg take 1-2 at night  #45 one refill       Joe Cruz  Female, 45year old, 10/12/1966  Last Weight:   143 lb 9.6 oz  Preferred Phone:   [phone]  Home#:   [phone] [place] notified

## 2024-01-04 NOTE — TELEPHONE ENCOUNTER
Cancel and have patient just follow up with her gastroenterologist as scheduled for further evaluation

## 2024-01-09 DIAGNOSIS — F51.02 INSOMNIA DUE TO STRESS: ICD-10-CM

## 2024-01-09 RX ORDER — TRAZODONE HYDROCHLORIDE 50 MG/1
50 TABLET ORAL NIGHTLY PRN
Qty: 90 TABLET | Refills: 0 | Status: SHIPPED | OUTPATIENT
Start: 2024-01-09

## 2024-01-18 ENCOUNTER — HOSPITAL ENCOUNTER (OUTPATIENT)
Dept: MAMMOGRAPHY | Facility: HOSPITAL | Age: 64
Discharge: HOME | End: 2024-01-18
Payer: COMMERCIAL

## 2024-01-18 ENCOUNTER — APPOINTMENT (OUTPATIENT)
Dept: BONE DENSITY | Facility: HOSPITAL | Age: 64
End: 2024-01-18
Payer: COMMERCIAL

## 2024-01-18 ENCOUNTER — APPOINTMENT (OUTPATIENT)
Dept: CT IMAGING | Facility: HOSPITAL | Age: 64
End: 2024-01-18
Payer: COMMERCIAL

## 2024-01-18 DIAGNOSIS — M81.0 AGE-RELATED OSTEOPOROSIS WITHOUT CURRENT PATHOLOGICAL FRACTURE: ICD-10-CM

## 2024-01-18 DIAGNOSIS — Z78.0 POSTMENOPAUSAL: ICD-10-CM

## 2024-01-18 DIAGNOSIS — Z12.31 VISIT FOR SCREENING MAMMOGRAM: ICD-10-CM

## 2024-01-18 PROCEDURE — 77063 BREAST TOMOSYNTHESIS BI: CPT

## 2024-01-18 PROCEDURE — 77067 SCR MAMMO BI INCL CAD: CPT

## 2024-01-18 PROCEDURE — 77080 DXA BONE DENSITY AXIAL: CPT

## 2024-01-26 ENCOUNTER — TELEPHONE (OUTPATIENT)
Dept: INTERNAL MEDICINE | Facility: CLINIC | Age: 64
End: 2024-01-26
Payer: COMMERCIAL

## 2024-02-02 ENCOUNTER — TELEPHONE (OUTPATIENT)
Dept: INTERNAL MEDICINE | Facility: CLINIC | Age: 64
End: 2024-02-02
Payer: COMMERCIAL

## 2024-02-02 NOTE — TELEPHONE ENCOUNTER
Spoke with Pt, advised her we received the order form and faxed it back. Also advised her that the order form states 48 oz per month so they may of provided her with 2 bottles to get her by until they received the order back from us. I have re-faxed order form.

## 2024-02-02 NOTE — TELEPHONE ENCOUNTER
Caller: Elena Ma    Relationship: Self    Best call back number: [phone]    Which medication are you concerned about:     OSTOMY SUPPLIES - ODOR ELIMINATOR    What are your concerns:     PATIENT NORMALLY GETS SIX BOTTLES OF THE GENEVIEVE DEODORANT DROPS  (ODOR ELIMINATOR)      SHE ONLY GOT TWO BOTTLES BUT NEEDS MORE BECAUSE SHE EMPTIES HER POUCH QUITE FREQUENTLY    [facility] DELIV[facility] THESE SUPPLIES FROM [place], [place]    PHONE - [phone]    NOT SURE WHY SHE ONLY GOT TWO BOTTLES    [facility] STATED THEY FAXED REQUEST FOR OSTOMY SUPPLIES A COUPLE OF TIMES

## 2024-02-21 DIAGNOSIS — J44.9 CHRONIC OBSTRUCTIVE PULMONARY DISEASE, UNSPECIFIED COPD TYPE: ICD-10-CM

## 2024-02-21 RX ORDER — ALBUTEROL SULFATE 90 UG/1
2 AEROSOL, METERED RESPIRATORY (INHALATION) EVERY 4 HOURS PRN
Qty: 18 G | Refills: 0 | Status: SHIPPED | OUTPATIENT
Start: 2024-02-21

## 2024-03-25 DIAGNOSIS — J44.9 CHRONIC OBSTRUCTIVE PULMONARY DISEASE, UNSPECIFIED COPD TYPE: ICD-10-CM

## 2024-03-25 RX ORDER — ALBUTEROL SULFATE 90 UG/1
2 AEROSOL, METERED RESPIRATORY (INHALATION) EVERY 4 HOURS PRN
Qty: 18 G | Refills: 3 | Status: SHIPPED | OUTPATIENT
Start: 2024-03-25 | End: 2024-03-26 | Stop reason: SDUPTHER

## 2024-03-26 DIAGNOSIS — J44.9 CHRONIC OBSTRUCTIVE PULMONARY DISEASE, UNSPECIFIED COPD TYPE: ICD-10-CM

## 2024-03-26 RX ORDER — ALBUTEROL SULFATE 90 UG/1
2 AEROSOL, METERED RESPIRATORY (INHALATION) EVERY 4 HOURS PRN
Qty: 18 G | Refills: 3 | Status: SHIPPED | OUTPATIENT
Start: 2024-03-26

## 2024-03-26 RX ORDER — LIDOCAINE 5 %
ADHESIVE PATCH, MEDICATED TOPICAL
Qty: 30 PATCH | Refills: 5 | Status: SHIPPED | OUTPATIENT
Start: 2024-03-26 | End: 2024-03-28 | Stop reason: SDUPTHER

## 2024-03-28 RX ORDER — LIDOCAINE 5 %
ADHESIVE PATCH, MEDICATED TOPICAL
Qty: 30 PATCH | Refills: 5 | Status: SHIPPED | OUTPATIENT
Start: 2024-03-28

## 2024-04-26 ENCOUNTER — OFFICE VISIT (OUTPATIENT)
Dept: INTERNAL MEDICINE | Facility: CLINIC | Age: 64
End: 2024-04-26
Payer: COMMERCIAL

## 2024-04-26 VITALS
HEIGHT: 61 IN | OXYGEN SATURATION: 92 % | SYSTOLIC BLOOD PRESSURE: 110 MMHG | WEIGHT: 91 LBS | HEART RATE: 73 BPM | TEMPERATURE: 97.8 F | BODY MASS INDEX: 17.18 KG/M2 | DIASTOLIC BLOOD PRESSURE: 60 MMHG

## 2024-04-26 DIAGNOSIS — K50.90 CROHN'S DISEASE WITHOUT COMPLICATION, UNSPECIFIED GASTROINTESTINAL TRACT LOCATION: ICD-10-CM

## 2024-04-26 DIAGNOSIS — K21.9 GASTROESOPHAGEAL REFLUX DISEASE, UNSPECIFIED WHETHER ESOPHAGITIS PRESENT: ICD-10-CM

## 2024-04-26 DIAGNOSIS — J44.9 CHRONIC OBSTRUCTIVE PULMONARY DISEASE, UNSPECIFIED COPD TYPE: Primary | ICD-10-CM

## 2024-04-26 DIAGNOSIS — M25.512 ARTHRALGIA OF LEFT SHOULDER REGION: ICD-10-CM

## 2024-04-26 DIAGNOSIS — M81.0 AGE-RELATED OSTEOPOROSIS WITHOUT CURRENT PATHOLOGICAL FRACTURE: ICD-10-CM

## 2024-04-26 DIAGNOSIS — Z87.81 HISTORY OF CERVICAL FRACTURE: ICD-10-CM

## 2024-04-26 DIAGNOSIS — E03.9 HYPOTHYROIDISM, UNSPECIFIED TYPE: ICD-10-CM

## 2024-04-26 DIAGNOSIS — M43.12 SPONDYLOLISTHESIS OF CERVICAL REGION: ICD-10-CM

## 2024-04-26 DIAGNOSIS — Z72.0 NICOTINE ABUSE: ICD-10-CM

## 2024-04-26 DIAGNOSIS — E53.8 B12 DEFICIENCY: ICD-10-CM

## 2024-04-26 RX ORDER — IPRATROPIUM BROMIDE AND ALBUTEROL SULFATE 2.5; .5 MG/3ML; MG/3ML
3 SOLUTION RESPIRATORY (INHALATION) EVERY 4 HOURS PRN
Qty: 360 ML | Refills: 3 | Status: SHIPPED | OUTPATIENT
Start: 2024-04-26 | End: 2024-04-29 | Stop reason: SDUPTHER

## 2024-04-26 RX ORDER — PHENOL 1.4 %
600 AEROSOL, SPRAY (ML) MUCOUS MEMBRANE 2 TIMES DAILY WITH MEALS
Qty: 180 TABLET | Refills: 3 | Status: SHIPPED | OUTPATIENT
Start: 2024-04-26

## 2024-04-26 RX ORDER — METHOCARBAMOL 750 MG/1
750 TABLET, FILM COATED ORAL 2 TIMES DAILY PRN
Qty: 30 TABLET | Refills: 0 | Status: SHIPPED | OUTPATIENT
Start: 2024-04-26

## 2024-04-26 RX ORDER — CYANOCOBALAMIN 1000 UG/ML
1000 INJECTION, SOLUTION INTRAMUSCULAR; SUBCUTANEOUS
Qty: 1 ML | Refills: 11 | Status: SHIPPED | OUTPATIENT
Start: 2024-04-26

## 2024-04-26 RX ORDER — GABAPENTIN 300 MG/1
300 CAPSULE ORAL 3 TIMES DAILY PRN
Qty: 90 CAPSULE | Refills: 0 | Status: SHIPPED | OUTPATIENT
Start: 2024-04-26

## 2024-04-26 RX ORDER — VARENICLINE TARTRATE 0.5 (11)-1
KIT ORAL
Qty: 1 EACH | Refills: 0 | Status: SHIPPED | OUTPATIENT
Start: 2024-04-26 | End: 2024-05-24

## 2024-04-26 RX ORDER — VARENICLINE TARTRATE 1 MG/1
1 TABLET, FILM COATED ORAL 2 TIMES DAILY
Qty: 56 TABLET | Refills: 1 | Status: SHIPPED | OUTPATIENT
Start: 2024-05-24 | End: 2024-07-19

## 2024-04-26 RX ORDER — CYANOCOBALAMIN 1000 UG/ML
1000 INJECTION, SOLUTION INTRAMUSCULAR; SUBCUTANEOUS ONCE
Status: COMPLETED | OUTPATIENT
Start: 2024-04-26 | End: 2024-04-26

## 2024-04-26 RX ADMIN — CYANOCOBALAMIN 1000 MCG: 1000 INJECTION, SOLUTION INTRAMUSCULAR; SUBCUTANEOUS at 11:48

## 2024-04-26 NOTE — PROGRESS NOTES
Office Visit      Date: 2024   Patient Name: Elena Ma  : 1960   MRN: [id number]     Chief Complaint:    Chief Complaint   Patient presents with   • Follow-up   • Hypothyroidism   • Insomnia   • Heartburn       History of Present Illness: Elena Ma is a 64 y.o. female ollowing up for chronic conditions.     COPD is uncontrolled currently.  She has been having to use her rescue inhaler more often.  Needs a refill of nebulizer solution.  Wants to avoid steroid inhaler due to history of osteoporosis.  She takes Anoro daily.  She has been to call and reschedule her lung cancer screening as she was unable to complete it on the day she had her DEXA scan and mammogram.      She takes Fosamax but DEXA scan still showed osteoporosis so she has been on this for years and it has not improved her bone quality.  Was going to go on infusions before in the past but did not.  She has not seen a rheumatologist before.    TSH was elevated.  Her dose was increased to 150 mcg daily and it is time to recheck her TSH.  She has been taking every day on empty stomach at the same time    Continues to have history of neck, left shoulder issue/arthritis.  She takes gabapentin and Robaxin as needed.  It has been doing better so she has not needed it lately, but she likes to have on hand in case she needs it.  Declines PT or Ortho referral at this time since it is doing okay but if it worsens she would be willing to see an orthopedic provider and will keep me updated.  She also uses lidocaine patches, Voltaren gel as needed.    Wanting to start Chantix for nicotine abuse.  Patient states she plans to stop smoking on 2024.  She would like the blister packs sent as these are easier to take.    Patient followed up with gastroenterologist regarding abnormal CT of abdomen.  They told her this is nothing to worry about and she can follow-up with them as needed.  She has a history of total colectomy due to Crohn's  disease.  She has a colostomy.  She has a history of vitamin B12 deficiency and even though her B12 levels have been okay with labs, gastroenterologist that she needed to be on B12 injections for the rest of her life because she would not absorb the B12 in her diet or an oral supplement.    Takes omeprazole for GERD which is stable    Follows urology for urge incontinence and takes Vibegron 75 mg daily with good relief    She has lost 5 pounds since last visit.  Patient states she is under a lot of stress with caring for her brother as he has recently had to have a toe amputated.  Patient states she does eat regularly and feels as though her total colectomy and colostomy prevents her from absorbing nutrients and makes it harder for her to gain weight.  She significantly lost a lot of weight after the death of her daughter due to extreme stress.  Patient states depression and anxiety are controlled but symptoms do vary.  She used to drink Ensure and boost and plans to start these again but she does not like how thick the fluid is.  She lacks [place] breakfast drinks.    Subjective      Review of Systems:   Pertinent ROS noted in HPI.     I have reviewed the patients family history, social history, past medical history, past surgical history and have updated it as appropriate.     Medications:     Current Outpatient Medications:   •  gabapentin (NEURONTIN) 300 MG capsule, Take 1 capsule by mouth 3 (Three) Times a Day As Needed (neuropathc pain)., Disp: 90 capsule, Rfl: 0  •  methocarbamol (ROBAXIN) 750 MG tablet, Take 1 tablet by mouth 2 (Two) Times a Day As Needed for Muscle Spasms., Disp: 30 tablet, Rfl: 0  •  Anoro Ellipta 62.5-25 MCG/ACT aerosol powder  inhaler, Inhale 1 puff by mouth once daily, Disp: 180 each, Rfl: 0  •  calcium carbonate (Calcium 600) 600 MG tablet, Take 1 tablet by mouth 2 (Two) Times a Day With Meals., Disp: 180 tablet, Rfl: 3  •  Cholecalciferol (vitamin D3) 125 MCG (5000 UT) tablet  "tablet, Take 1 tablet by mouth Daily., Disp: 90 tablet, Rfl: 1  •  cyanocobalamin 1000 MCG/ML injection, Inject 1 mL into the appropriate muscle as directed by prescriber Every 28 (Twenty-Eight) Days., Disp: 1 mL, Rfl: 11  •  Diclofenac Sodium (VOLTAREN EX), Apply  topically., Disp: , Rfl:   •  ibuprofen (ADVIL,MOTRIN) 800 MG tablet, Take 1 tablet by mouth 3 (Three) Times a Day As Needed., Disp: , Rfl:   •  ipratropium-albuterol (DUO-NEB) 0.5-2.5 mg/3 ml nebulizer, Take 3 mL by nebulization Every 4 (Four) Hours As Needed for Wheezing or Shortness of Air., Disp: 360 mL, Rfl: 3  •  levothyroxine (SYNTHROID, LEVOTHROID) 150 MCG tablet, Take 1 tablet by mouth Daily., Disp: 90 tablet, Rfl: 0  •  Lidoderm 5 %, Remove & Discard patch within 12 hours or as directed by MD. DX:Arthralgia of left shoulder region, Disp: 30 patch, Rfl: 5  •  Magnesium 250 MG tablet, Take 1 tablet by mouth Daily., Disp: , Rfl:   •  Omeprazole 20 MG tablet delayed-release, Take 20 mg by mouth Daily., Disp: 90 each, Rfl: 1  •  ondansetron (ZOFRAN) 4 MG tablet, Take 1 tablet by mouth As Needed for Nausea or Vomiting., Disp: , Rfl:   •  Potassium 99 MG tablet, Take 1,000 mcg by mouth Daily., Disp: , Rfl:   •  Syringe/Needle, Disp, 25G X 1\" 1 ML misc, Use as directed to inject b12 once a month, Disp: 1 each, Rfl: 11  •  traZODone (DESYREL) 50 MG tablet, TAKE 1 TABLET BY MOUTH AT NIGHT AS NEEDED FOR SLEEP, Disp: 90 tablet, Rfl: 0  •  valACYclovir (VALTREX) 500 MG tablet, Take 1 tablet by mouth Daily., Disp: 90 tablet, Rfl: 1  •  [START ON 5/24/2024] varenicline (Chantix Continuing Month Benedict) 1 MG tablet, Take 1 tablet by mouth 2 (Two) Times a Day for 56 days., Disp: 56 tablet, Rfl: 1  •  Varenicline Tartrate, Starter, 0.5 MG X 11 & 1 MG X 42 tablet therapy pack, Take 0.5 mg by mouth Daily for 3 days, THEN 0.5 mg 2 (Two) Times a Day for 4 days, THEN 1 mg 2 (Two) Times a Day for 21 days. Take 0.5 mg po daily x 3 days, then 0.5 mg po bid x 4 days, then 1 " mg po bid, Disp: 1 each, Rfl: 0  •  Ventolin  (90 Base) MCG/ACT inhaler, Inhale 2 puffs Every 4 (Four) Hours As Needed for Wheezing., Disp: 18 g, Rfl: 3  •  Vibegron (Gemtesa) 75 MG tablet, Take 1 tablet by mouth Daily., Disp: 30 tablet, Rfl: 11  No current facility-administered medications for this visit.    Allergies:   Allergies   Allergen Reactions   • Codeine GI Intolerance   • Hydrocodone Other (See Comments)     nausea   • Levofloxacin Rash   • Zithromax [Azithromycin] Itching       Objective     Physical Exam  Physical Exam  Vitals and nursing note reviewed.   Constitutional:       Appearance: Normal appearance. She is underweight. She is cachectic.   HENT:      Head: Normocephalic and atraumatic.      Right Ear: External ear normal.      Left Ear: External ear normal.      Nose: Nose normal.      Mouth/Throat:      Mouth: Mucous membranes are moist.      Pharynx: Oropharynx is clear.   Eyes:      General: No scleral icterus.     Extraocular Movements: Extraocular movements intact.      Conjunctiva/sclera: Conjunctivae normal.      Pupils: Pupils are equal, round, and reactive to light.   Neck:      Thyroid: No thyromegaly.   Cardiovascular:      Rate and Rhythm: Normal rate and regular rhythm.      Heart sounds: Normal heart sounds.   Pulmonary:      Effort: Pulmonary effort is normal.      Breath sounds: Wheezing present.   Abdominal:      General: There is no distension.      Palpations: Abdomen is soft.      Tenderness: There is no abdominal tenderness. There is no guarding.   Musculoskeletal:         General: Normal range of motion.      Cervical back: Normal range of motion and neck supple.   Lymphadenopathy:      Cervical: No cervical adenopathy.   Skin:     General: Skin is warm and dry.      Findings: No rash.   Neurological:      Mental Status: She is alert and oriented to person, place, and time.      Cranial Nerves: No cranial nerve deficit.      Motor: No weakness.      Coordination:  "Coordination normal.      Gait: Gait normal.   Psychiatric:         Mood and Affect: Mood normal.         Behavior: Behavior normal.         Thought Content: Thought content normal.       Vital Signs:   Vitals:    04/26/24 1048   BP: 110/60   Pulse: 73   Temp: 97.8 °F (36.6 °C)   SpO2: 92%   Weight: 41.3 kg (91 lb)   Height: 154.9 cm (60.98\")     Body mass index is 17.21 kg/m².  BMI is below normal parameters (malnutrition). Recommendations: treating the underlying disease process, adding Ensure/boost 3 times a day, update lung cancer screening to ensure no malignancy, managing depression and anxiety     Assessment / Plan      Assessment/Plan:   Problem List Items Addressed This Visit    None  Visit Diagnoses       Chronic obstructive pulmonary disease, unspecified COPD type    -  Primary    Relevant Medications    ipratropium-albuterol (DUO-NEB) 0.5-2.5 mg/3 ml nebulizer    Other Relevant Orders    Comprehensive Metabolic Panel    CBC No Differential  Uncontrolled, oxygen 92% today  Discuss changing to Trelegy inhaler  Patient wishes to not make any changes at this time  She will do her nebulizer treatments more often and stop smoking and wants to reevaluate after this  Patient is to call the hospital back and reschedule her lung cancer screening CT  If significant COPD/emphysema she will need a referral to a pulmonologist for formal PFTs  Monitor oxygen levels, if less than 90% or any respiratory distress be seen emergently    Age-related osteoporosis without current pathological fracture        Relevant Orders    Comprehensive Metabolic Panel    CBC No Differential    Ambulatory Referral to Rheumatology  Stop Fosamax  She has been on this medication for years and continues to have osteoporosis without improvement  Consider infusions  Referral to rheumatologist, Dr. Noriega's office to discuss options  Continue vitamin D 5000 IU, calcium 1200 mg daily, 15-20 minutes of sunlight daily, weightbearing exercises    " "Hypothyroidism, unspecified type        Relevant Orders    TSH Rfx On Abnormal To Free T4    Comprehensive Metabolic Panel    CBC No Differential  Update TSH, continue current dose of levothyroxine as prescribed    Spondylolisthesis of cervical region        Relevant Medications    gabapentin (NEURONTIN) 300 MG capsule    methocarbamol (ROBAXIN) 750 MG tablet  Stable.  Pain is controlled with gabapentin and naproxen as needed.  UDS up-to-date.  Discussion today regarding addictive nature of controlled substances. A controlled substance agreement was explained in detail and signed, see scanned media. Patient verbally understood the need for visits where UDS will be performed and may be called in for a pill count or drug screen at any time. Any failure or discrepancy on SEMAJ will result in immediate discontinuation. SEMAJ obtained and appropriate.    History of cervical fracture        Relevant Medications    gabapentin (NEURONTIN) 300 MG capsule    methocarbamol (ROBAXIN) 750 MG tablet  See above    Nicotine abuse        Relevant Medications    Varenicline Tartrate, Starter, 0.5 MG X 11 & 1 MG X 42 tablet therapy pack    varenicline (Chantix Continuing Month Benedict) 1 MG tablet (Start on 5/24/2024)  Long term effects of continued use such as but not limited to lung cancer, oral cavity cancer, CAD, PAD, ASCVD etc were discussed with the patient. Time spent in counseling was 3-10 min. The following tobacco cessation resources were provided to patient: [id number]. The following medications were prescribed/suggested to the patient: chantix.  Patient set quit goal on 4/28/2024.    B12 deficiency        Relevant Medications    cyanocobalamin injection 1,000 mcg (Completed)    Syringe/Needle, Disp, 25G X 1\" 1 ML misc    cyanocobalamin 1000 MCG/ML injection  With total colectomy, will start patient on B12 injections monthly for the rest of her life  First dose administered today  Prescription sent to [facility], patient is " "to administer B12 once a month intramuscularly  Declines coming in office for this and says she has someone that can help administer the injections    Crohn's disease without complication, unspecified gastrointestinal tract location        Relevant Medications    cyanocobalamin injection 1,000 mcg (Completed)    Syringe/Needle, Disp, 25G X 1\" 1 ML misc    cyanocobalamin 1000 MCG/ML injection  Saw gastroenterologist, Dr. Quan and she told her to follow-up just as needed  Will start B12 injections  Continue colostomy care  Start Ensure/boost 3 times a day    Arthralgia of left shoulder region        Relevant Medications    gabapentin (NEURONTIN) 300 MG capsule    methocarbamol (ROBAXIN) 750 MG tablet  Currently stable, continue medications as prescribed, range of motion exercises, heat and ice, Voltaren gel, Tylenol arthritis as needed.  Can refer to orthopedics if any worsening of the condition.    Gastroesophageal reflux disease, unspecified whether esophagitis present               Stable continue omeprazole 20 mg prn follow reflux measures     Adult BMI <19 kg/sq m               Update lung cancer screening, start boost/ensure 3x daily, eat high calorie foods            Combination of stress and nutritional deficiency consider starting remeron if no weight gain             I spent 54 minutes caring for Elena Ma on this date of service. This time includes time spent by me in the following activities: preparing for the visit, reviewing tests, performing a medically appropriate examination and/or evaluation, counseling and educating the patient/family/caregiver, ordering medications, tests, or procedures and documenting information in the medical record.    Follow Up:   Return in about 6 months (around 10/26/2024) for Annual.      Jenifer CAM  [facility]  "

## 2024-04-27 LAB
ALBUMIN SERPL-MCNC: 4.5 G/DL (ref 3.5–5.2)
ALBUMIN/GLOB SERPL: 1.7 G/DL
ALP SERPL-CCNC: 74 U/L (ref 39–117)
ALT SERPL-CCNC: 16 U/L (ref 1–33)
AST SERPL-CCNC: 19 U/L (ref 1–32)
BILIRUB SERPL-MCNC: 0.4 MG/DL (ref 0–1.2)
BUN SERPL-MCNC: 12 MG/DL (ref 8–23)
BUN/CREAT SERPL: 13.5 (ref 7–25)
CALCIUM SERPL-MCNC: 10.1 MG/DL (ref 8.6–10.5)
CHLORIDE SERPL-SCNC: 101 MMOL/L (ref 98–107)
CO2 SERPL-SCNC: 25.5 MMOL/L (ref 22–29)
CREAT SERPL-MCNC: 0.89 MG/DL (ref 0.57–1)
EGFRCR SERPLBLD CKD-EPI 2021: 72.1 ML/MIN/1.73
ERYTHROCYTE [DISTWIDTH] IN BLOOD BY AUTOMATED COUNT: 13.3 % (ref 12.3–15.4)
GLOBULIN SER CALC-MCNC: 2.6 GM/DL
GLUCOSE SERPL-MCNC: 90 MG/DL (ref 65–99)
HCT VFR BLD AUTO: 45 % (ref 34–46.6)
HGB BLD-MCNC: 14.9 G/DL (ref 12–15.9)
MCH RBC QN AUTO: 32 PG (ref 26.6–33)
MCHC RBC AUTO-ENTMCNC: 33.1 G/DL (ref 31.5–35.7)
MCV RBC AUTO: 96.8 FL (ref 79–97)
PLATELET # BLD AUTO: 211 10*3/MM3 (ref 140–450)
POTASSIUM SERPL-SCNC: 4.5 MMOL/L (ref 3.5–5.2)
PROT SERPL-MCNC: 7.1 G/DL (ref 6–8.5)
RBC # BLD AUTO: 4.65 10*6/MM3 (ref 3.77–5.28)
SODIUM SERPL-SCNC: 138 MMOL/L (ref 136–145)
T4 FREE SERPL-MCNC: 3.32 NG/DL (ref 0.93–1.7)
TSH SERPL DL<=0.005 MIU/L-ACNC: 0.31 UIU/ML (ref 0.27–4.2)
WBC # BLD AUTO: 9.69 10*3/MM3 (ref 3.4–10.8)

## 2024-04-29 RX ORDER — IPRATROPIUM BROMIDE AND ALBUTEROL SULFATE 2.5; .5 MG/3ML; MG/3ML
3 SOLUTION RESPIRATORY (INHALATION) EVERY 4 HOURS PRN
Qty: 360 ML | Refills: 3 | Status: SHIPPED | OUTPATIENT
Start: 2024-04-29

## 2024-04-30 DIAGNOSIS — E03.9 HYPOTHYROIDISM, UNSPECIFIED TYPE: ICD-10-CM

## 2024-04-30 RX ORDER — LEVOTHYROXINE SODIUM 137 UG/1
137 TABLET ORAL DAILY
Qty: 90 TABLET | Refills: 0 | Status: SHIPPED | OUTPATIENT
Start: 2024-04-30

## 2024-05-01 ENCOUNTER — PATIENT MESSAGE (OUTPATIENT)
Dept: INTERNAL MEDICINE | Facility: CLINIC | Age: 64
End: 2024-05-01
Payer: COMMERCIAL

## 2024-05-01 DIAGNOSIS — J44.9 CHRONIC OBSTRUCTIVE PULMONARY DISEASE, UNSPECIFIED COPD TYPE: ICD-10-CM

## 2024-05-01 DIAGNOSIS — K21.9 GASTROESOPHAGEAL REFLUX DISEASE WITHOUT ESOPHAGITIS: ICD-10-CM

## 2024-05-03 RX ORDER — NICOTINE POLACRILEX 4 MG/1
1 GUM, CHEWING ORAL DAILY
Qty: 90 EACH | Refills: 2 | Status: SHIPPED | OUTPATIENT
Start: 2024-05-03

## 2024-05-03 RX ORDER — UMECLIDINIUM BROMIDE AND VILANTEROL TRIFENATATE 62.5; 25 UG/1; UG/1
POWDER RESPIRATORY (INHALATION)
Qty: 180 EACH | Refills: 2 | Status: SHIPPED | OUTPATIENT
Start: 2024-05-03

## 2024-05-03 NOTE — TELEPHONE ENCOUNTER
Sent in Anoro and Omeprazole to [facility] [place] after discussing with patient she is informed to get OTC Vit 5000 IU  and Calcium 1200 mg to take daily.

## 2024-06-03 RX ORDER — IPRATROPIUM BROMIDE AND ALBUTEROL SULFATE 2.5; .5 MG/3ML; MG/3ML
3 SOLUTION RESPIRATORY (INHALATION) EVERY 4 HOURS PRN
Qty: 360 ML | Refills: 3 | Status: SHIPPED | OUTPATIENT
Start: 2024-06-03

## 2024-07-17 PROBLEM — M81.0 SENILE OSTEOPOROSIS: Status: ACTIVE | Noted: 2024-07-17

## 2024-07-18 ENCOUNTER — HOSPITAL ENCOUNTER (OUTPATIENT)
Dept: INFUSION THERAPY | Facility: HOSPITAL | Age: 64
Discharge: HOME | End: 2024-07-18
Payer: COMMERCIAL

## 2024-07-18 VITALS
SYSTOLIC BLOOD PRESSURE: 101 MMHG | DIASTOLIC BLOOD PRESSURE: 64 MMHG | HEART RATE: 65 BPM | RESPIRATION RATE: 18 BRPM | OXYGEN SATURATION: 98 %

## 2024-07-18 DIAGNOSIS — M81.0 SENILE OSTEOPOROSIS: Primary | ICD-10-CM

## 2024-07-18 PROCEDURE — 96372 THER/PROPH/DIAG INJ SC/IM: CPT

## 2024-07-18 PROCEDURE — 25010000002 DENOSUMAB 60 MG/ML SOLUTION PREFILLED SYRINGE: Performed by: INTERNAL MEDICINE

## 2024-07-18 RX ADMIN — DENOSUMAB 60 MG: 60 INJECTION SUBCUTANEOUS at 13:51

## 2024-07-18 NOTE — CODE DOCUMENTATION
Patient given Prolia information guide. No questions at this time. Patient educated on the importance of taking calcium and vitamin D while on Prolia. Patient understands.

## 2024-07-22 ENCOUNTER — OFFICE VISIT (OUTPATIENT)
Age: 64
End: 2024-07-22
Payer: COMMERCIAL

## 2024-07-22 VITALS
BODY MASS INDEX: 17.56 KG/M2 | HEIGHT: 61 IN | SYSTOLIC BLOOD PRESSURE: 112 MMHG | WEIGHT: 93 LBS | DIASTOLIC BLOOD PRESSURE: 68 MMHG

## 2024-07-22 DIAGNOSIS — R31.29 OTHER MICROSCOPIC HEMATURIA: Primary | ICD-10-CM

## 2024-07-22 DIAGNOSIS — N39.41 URGE INCONTINENCE: ICD-10-CM

## 2024-07-22 LAB
BILIRUB BLD-MCNC: NEGATIVE MG/DL
CLARITY, POC: ABNORMAL
COLOR UR: ABNORMAL
EXPIRATION DATE: ABNORMAL
GLUCOSE UR STRIP-MCNC: NEGATIVE MG/DL
KETONES UR QL: NEGATIVE
LEUKOCYTE EST, POC: ABNORMAL
Lab: ABNORMAL
NITRITE UR-MCNC: NEGATIVE MG/ML
PH UR: 6 [PH] (ref 5–8)
PROT UR STRIP-MCNC: ABNORMAL MG/DL
RBC # UR STRIP: NEGATIVE /UL
SP GR UR: 1.03 (ref 1–1.03)
UROBILINOGEN UR QL: NORMAL

## 2024-07-22 PROCEDURE — 1160F RVW MEDS BY RX/DR IN RCRD: CPT | Performed by: NURSE PRACTITIONER

## 2024-07-22 PROCEDURE — 1159F MED LIST DOCD IN RCRD: CPT | Performed by: NURSE PRACTITIONER

## 2024-07-22 PROCEDURE — 99213 OFFICE O/P EST LOW 20 MIN: CPT | Performed by: NURSE PRACTITIONER

## 2024-07-22 NOTE — PROGRESS NOTES
Office Visit General Established Female Patient     Patient Name: Elena Ma  : 1960   MRN: [id number]     Chief Complaint:   Chief Complaint   Patient presents with    Follow-up     Microscopic hematuria  incontinence       Referring Provider: No ref. provider found    History of Present Illness: Elena Ma is a 64 y.o. female with history below in assessment, who presents for follow up.   She denies any gross hematuria  her symptoms are urge incontinence, dark urine and low volume of urine  Has 3  6-8 oz cups of coffee in the am  Drinks 2-3 diet cokes daily  Drinks 6 oz oj every morning  Maybe 1 bottle of water daily   She reports she has quit smoking      Subjective      Review of System:   As noted in HPI     Past Medical History:   Past Medical History:   Diagnosis Date    Arthritis     Back problem     Bronchitis     Cervical compression fracture 2022    treated conservatively with a brace x 5 weeks    COPD, moderate     Crohn disease     Disease of thyroid gland     H/O bladder infections     Hernia, internal     Osteoporosis     Pneumonia     Rotator cuff syndrome 2022    Scoliosis 2020    Thyroid disease        Past Surgical History:   Past Surgical History:   Procedure Laterality Date    ECTOPIC PREGNANCY SURGERY      ILEOSTOMY      STOMACH SURGERY      VAGINA RECONSTRUCTION SURGERY         Family History:   Family History   Problem Relation Age of Onset    Cancer Mother     Heart disease Father     Heart attack Father     Diabetes Sister     Scoliosis Sister     Kidney disease Sister     Crohn's disease Sister     Lung disease Sister     Kidney disease Brother     Diabetes Brother     Arthritis Brother     Aneurysm Brother     Gout Brother     Broken bones Brother     Cancer Brother     Arthritis Brother     Diabetes Brother     Aneurysm Brother     Breast cancer Neg Hx     Ovarian cancer Neg Hx        Social History:   Social History     Socioeconomic History    Marital  status:    Tobacco Use    Smoking status: Every Day     Current packs/day: 1.00     Average packs/day: 1 pack/day for 50.0 years (50.0 ttl pk-yrs)     Types: Cigarettes     Passive exposure: Never    Smokeless tobacco: Never   Vaping Use    Vaping status: Never Used   Substance and Sexual Activity    Alcohol use: Yes     Alcohol/week: 2.0 standard drinks of alcohol     Types: 2 Drinks containing 0.5 oz of alcohol per week    Drug use: Never    Sexual activity: Not Currently       Medications:     Current Outpatient Medications:     Anoro Ellipta 62.5-25 MCG/ACT aerosol powder  inhaler, Inhale 1 puff by mouth once daily, Disp: 180 each, Rfl: 2    calcium carbonate (Calcium 600) 600 MG tablet, Take 1 tablet by mouth 2 (Two) Times a Day With Meals., Disp: 180 tablet, Rfl: 3    Cholecalciferol (vitamin D3) 125 MCG (5000 UT) tablet tablet, Take 1 tablet by mouth Daily., Disp: 90 tablet, Rfl: 1    cyanocobalamin 1000 MCG/ML injection, Inject 1 mL into the appropriate muscle as directed by prescriber Every 28 (Twenty-Eight) Days., Disp: 1 mL, Rfl: 11    Diclofenac Sodium (VOLTAREN EX), Apply  topically., Disp: , Rfl:     gabapentin (NEURONTIN) 300 MG capsule, Take 1 capsule by mouth 3 (Three) Times a Day As Needed (neuropathc pain)., Disp: 90 capsule, Rfl: 0    ibuprofen (ADVIL,MOTRIN) 800 MG tablet, Take 1 tablet by mouth 3 (Three) Times a Day As Needed., Disp: , Rfl:     ipratropium-albuterol (DUO-NEB) 0.5-2.5 mg/3 ml nebulizer, Take 3 mL by nebulization Every 4 (Four) Hours As Needed for Wheezing or Shortness of Air. DX: J44.9 COPD, Disp: 360 mL, Rfl: 3    levothyroxine (SYNTHROID, LEVOTHROID) 137 MCG tablet, Take 1 tablet by mouth Daily., Disp: 90 tablet, Rfl: 0    Lidoderm 5 %, Remove & Discard patch within 12 hours or as directed by MD. DX:Arthralgia of left shoulder region, Disp: 30 patch, Rfl: 5    Magnesium 250 MG tablet, Take 1 tablet by mouth Daily., Disp: , Rfl:     methocarbamol (ROBAXIN) 750 MG  "tablet, Take 1 tablet by mouth 2 (Two) Times a Day As Needed for Muscle Spasms., Disp: 30 tablet, Rfl: 0    Omeprazole 20 MG tablet delayed-release, Take 20 mg by mouth Daily., Disp: 90 each, Rfl: 2    ondansetron (ZOFRAN) 4 MG tablet, Take 1 tablet by mouth As Needed for Nausea or Vomiting., Disp: , Rfl:     Potassium 99 MG tablet, Take 1,000 mcg by mouth Daily., Disp: , Rfl:     Syringe/Needle, Disp, 25G X 1\" 1 ML misc, Use as directed to inject b12 once a month, Disp: 1 each, Rfl: 11    traZODone (DESYREL) 50 MG tablet, TAKE 1 TABLET BY MOUTH AT NIGHT AS NEEDED FOR SLEEP, Disp: 90 tablet, Rfl: 0    valACYclovir (VALTREX) 500 MG tablet, Take 1 tablet by mouth Daily., Disp: 90 tablet, Rfl: 1    Ventolin  (90 Base) MCG/ACT inhaler, Inhale 2 puffs Every 4 (Four) Hours As Needed for Wheezing., Disp: 18 g, Rfl: 3    Vibegron (Gemtesa) 75 MG tablet, Take 1 tablet by mouth Daily., Disp: 30 tablet, Rfl: 11    Allergies:   Allergies   Allergen Reactions    Codeine GI Intolerance    Hydrocodone Other (See Comments)     nausea    Levofloxacin Rash    Zithromax [Azithromycin] Itching       Objective     Physical Exam:   Vital Signs:   Visit Vitals  /68 (BP Location: Left arm, Patient Position: Sitting, Cuff Size: Adult)   Ht 154.9 cm (60.98\")   Wt 42.2 kg (93 lb)   BMI 17.58 kg/m²      Body mass index is 17.58 kg/m².     Physical Exam  Vitals and nursing note reviewed.   Constitutional:       General: She is not in acute distress.     Appearance: Normal appearance. She is not ill-appearing.   Pulmonary:      Effort: Pulmonary effort is normal. No respiratory distress.   Skin:     General: Skin is warm and dry.   Neurological:      General: No focal deficit present.      Mental Status: She is alert and oriented to person, place, and time.   Psychiatric:         Mood and Affect: Mood normal.         Behavior: Behavior normal.          Labs  Brief Urine Lab Results  (Last result in the past 365 days)        Color   " Clarity   Blood   Leuk Est   Nitrite   Protein   CREAT   Urine HCG        07/22/24 1256 Dark Yellow   Slightly Cloudy   Negative   Moderate (2+)   Negative   1+                   Lab Results   Component Value Date    GLUCOSE 90 04/26/2024    CALCIUM 10.1 04/26/2024     04/26/2024    K 4.5 04/26/2024    CO2 25.5 04/26/2024     04/26/2024    BUN 12 04/26/2024    CREATININE 0.89 04/26/2024    EGFRIFAFRI >60 07/20/2022    EGFRIFNONA 54 (L) 07/20/2022    BCR 13.5 04/26/2024    ANIONGAP 11 07/20/2022       Lab Results   Component Value Date    WBC 9.69 04/26/2024    HGB 14.9 04/26/2024    HCT 45.0 04/26/2024    MCV 96.8 04/26/2024     04/26/2024       Urine Culture          12/4/2023    14:16 12/11/2023    16:13   Urine Culture   Urine Culture Final report  Final report         Radiographic Studies  No Images in the past 120 days found..    I have reviewed the above labs and imaging.     Assessment / Plan      Assessment/Plan:   Diagnoses and all orders for this visit:    1. Other microscopic hematuria (Primary)  -     POC Urinalysis Dipstick, Automated    2. Urge incontinence    65 yo female initially sent to Urology for microscopic hematuria on UA upon further evaluation with microscopic urinalysis patient had no blood in urine she was supposed to return in 4 weeks for a repeat urine microscopy and did not return.  Today UA is negative for blood, slightly cloudy with a specific gravity of 1.030, 2+ leukocytes and 1+ protein.  She has a UTI symptoms we discussed her symptom of dark urine and decreased urine volume is likely related to fluid intake she drinks very little water, mostly coffee tea diet soda and orange juice.  She does have urgency and occasional urge incontinence if she waits too long to go to the bathroom.  We discussed lifestyle modifications including decreasing bladder irritants and increasing water intake to 64 ounces daily.  Recent urine function test were normal and no concerns for  chronic kidney disease.  Patient agrees to work on lifestyle modifications and bladder training.  She did not start Gemtesa for urge incontinence would like to do lifestyle modifications and will reevaluate next visit.    Decrease or stop coffee and soda  Encouraged increasing water intake to 64 ounces daily  Recommend keep bladder read diary for 2 to 3 days including fluid intake and urinary volume output prior to next visit    Follow Up:   Return in about 2 months (around 9/22/2024) for Margaret.    DORINA Hendrix, NP-C  [facility]

## 2024-10-18 DIAGNOSIS — Z72.0 NICOTINE ABUSE: ICD-10-CM

## 2024-10-18 DIAGNOSIS — E03.9 HYPOTHYROIDISM, UNSPECIFIED TYPE: ICD-10-CM

## 2024-10-18 DIAGNOSIS — J44.9 CHRONIC OBSTRUCTIVE PULMONARY DISEASE, UNSPECIFIED COPD TYPE: ICD-10-CM

## 2024-10-18 DIAGNOSIS — E55.9 VITAMIN D DEFICIENCY: ICD-10-CM

## 2024-10-18 RX ORDER — VARENICLINE TARTRATE 1 MG/1
1 TABLET, FILM COATED ORAL 2 TIMES DAILY
Qty: 56 TABLET | Refills: 0 | Status: SHIPPED | OUTPATIENT
Start: 2024-10-18

## 2024-10-18 RX ORDER — LEVOTHYROXINE SODIUM 137 UG/1
137 TABLET ORAL DAILY
Qty: 90 TABLET | Refills: 0 | Status: CANCELLED | OUTPATIENT
Start: 2024-10-18

## 2024-10-18 RX ORDER — LEVOTHYROXINE SODIUM 137 UG/1
137 TABLET ORAL DAILY
Qty: 90 TABLET | Refills: 0 | Status: SHIPPED | OUTPATIENT
Start: 2024-10-18

## 2024-10-18 RX ORDER — ALBUTEROL SULFATE 90 UG/1
2 AEROSOL, METERED RESPIRATORY (INHALATION) EVERY 4 HOURS PRN
Qty: 18 G | Refills: 3 | Status: SHIPPED | OUTPATIENT
Start: 2024-10-18

## 2024-10-18 RX ORDER — IPRATROPIUM BROMIDE AND ALBUTEROL SULFATE 2.5; .5 MG/3ML; MG/3ML
3 SOLUTION RESPIRATORY (INHALATION) EVERY 4 HOURS PRN
Qty: 360 ML | Refills: 3 | Status: SHIPPED | OUTPATIENT
Start: 2024-10-18

## 2024-10-18 RX ORDER — VARENICLINE TARTRATE 1 MG/1
1 TABLET, FILM COATED ORAL 2 TIMES DAILY
Qty: 56 TABLET | Refills: 0 | Status: CANCELLED | OUTPATIENT
Start: 2024-10-18

## 2024-11-04 ENCOUNTER — OFFICE VISIT (OUTPATIENT)
Dept: INTERNAL MEDICINE | Facility: CLINIC | Age: 64
End: 2024-11-04
Payer: MEDICARE

## 2024-11-04 VITALS
HEIGHT: 61 IN | BODY MASS INDEX: 18.5 KG/M2 | SYSTOLIC BLOOD PRESSURE: 116 MMHG | WEIGHT: 98 LBS | DIASTOLIC BLOOD PRESSURE: 64 MMHG | OXYGEN SATURATION: 98 % | HEART RATE: 59 BPM | TEMPERATURE: 98 F

## 2024-11-04 DIAGNOSIS — E55.9 VITAMIN D DEFICIENCY: ICD-10-CM

## 2024-11-04 DIAGNOSIS — E53.8 B12 DEFICIENCY: ICD-10-CM

## 2024-11-04 DIAGNOSIS — F17.210 SMOKING GREATER THAN 20 PACK YEARS: ICD-10-CM

## 2024-11-04 DIAGNOSIS — E03.9 ACQUIRED HYPOTHYROIDISM: ICD-10-CM

## 2024-11-04 DIAGNOSIS — F32.A ANXIETY AND DEPRESSION: ICD-10-CM

## 2024-11-04 DIAGNOSIS — K50.90 CROHN'S DISEASE WITHOUT COMPLICATION, UNSPECIFIED GASTROINTESTINAL TRACT LOCATION: ICD-10-CM

## 2024-11-04 DIAGNOSIS — Z72.0 NICOTINE ABUSE: ICD-10-CM

## 2024-11-04 DIAGNOSIS — R94.31 ABNORMAL EKG: ICD-10-CM

## 2024-11-04 DIAGNOSIS — F51.02 INSOMNIA DUE TO STRESS: ICD-10-CM

## 2024-11-04 DIAGNOSIS — M81.0 SENILE OSTEOPOROSIS: ICD-10-CM

## 2024-11-04 DIAGNOSIS — N39.41 URGE INCONTINENCE: ICD-10-CM

## 2024-11-04 DIAGNOSIS — Z78.9 ALCOHOL USE: ICD-10-CM

## 2024-11-04 DIAGNOSIS — J44.9 CHRONIC OBSTRUCTIVE PULMONARY DISEASE, UNSPECIFIED COPD TYPE: ICD-10-CM

## 2024-11-04 DIAGNOSIS — Z00.00 WELCOME TO MEDICARE PREVENTIVE VISIT: Primary | ICD-10-CM

## 2024-11-04 DIAGNOSIS — Z12.31 SCREENING MAMMOGRAM FOR BREAST CANCER: ICD-10-CM

## 2024-11-04 DIAGNOSIS — Z51.81 MEDICATION MONITORING ENCOUNTER: ICD-10-CM

## 2024-11-04 DIAGNOSIS — K21.9 GASTROESOPHAGEAL REFLUX DISEASE WITHOUT ESOPHAGITIS: ICD-10-CM

## 2024-11-04 DIAGNOSIS — F41.9 ANXIETY AND DEPRESSION: ICD-10-CM

## 2024-11-04 DIAGNOSIS — N18.1 CHRONIC RENAL IMPAIRMENT, STAGE 1: ICD-10-CM

## 2024-11-04 DIAGNOSIS — B00.9 HERPES SIMPLEX VIRUS (HSV) INFECTION: ICD-10-CM

## 2024-11-04 DIAGNOSIS — E78.00 HYPERCHOLESTEROLEMIA: ICD-10-CM

## 2024-11-04 PROBLEM — F10.90 ALCOHOL USE: Status: ACTIVE | Noted: 2024-11-04

## 2024-11-04 PROCEDURE — 93000 ELECTROCARDIOGRAM COMPLETE: CPT | Performed by: NURSE PRACTITIONER

## 2024-11-04 PROCEDURE — 99214 OFFICE O/P EST MOD 30 MIN: CPT | Performed by: NURSE PRACTITIONER

## 2024-11-04 PROCEDURE — 96372 THER/PROPH/DIAG INJ SC/IM: CPT | Performed by: NURSE PRACTITIONER

## 2024-11-04 PROCEDURE — G0402 INITIAL PREVENTIVE EXAM: HCPCS | Performed by: NURSE PRACTITIONER

## 2024-11-04 PROCEDURE — 1125F AMNT PAIN NOTED PAIN PRSNT: CPT | Performed by: NURSE PRACTITIONER

## 2024-11-04 PROCEDURE — 1159F MED LIST DOCD IN RCRD: CPT | Performed by: NURSE PRACTITIONER

## 2024-11-04 PROCEDURE — 1160F RVW MEDS BY RX/DR IN RCRD: CPT | Performed by: NURSE PRACTITIONER

## 2024-11-04 PROCEDURE — 1170F FXNL STATUS ASSESSED: CPT | Performed by: NURSE PRACTITIONER

## 2024-11-04 RX ORDER — NICOTINE POLACRILEX 4 MG/1
1 GUM, CHEWING ORAL DAILY PRN
Qty: 90 EACH | Refills: 3 | Status: SHIPPED | OUTPATIENT
Start: 2024-11-04

## 2024-11-04 RX ORDER — CYANOCOBALAMIN 1000 UG/ML
1000 INJECTION, SOLUTION INTRAMUSCULAR; SUBCUTANEOUS ONCE
Status: COMPLETED | OUTPATIENT
Start: 2024-11-04 | End: 2024-11-04

## 2024-11-04 RX ORDER — ALBUTEROL SULFATE 90 UG/1
2 AEROSOL, METERED RESPIRATORY (INHALATION) EVERY 4 HOURS PRN
Qty: 18 G | Refills: 12 | Status: SHIPPED | OUTPATIENT
Start: 2024-11-04

## 2024-11-04 RX ORDER — TRAZODONE HYDROCHLORIDE 50 MG/1
50 TABLET, FILM COATED ORAL NIGHTLY PRN
Qty: 90 TABLET | Refills: 3 | Status: SHIPPED | OUTPATIENT
Start: 2024-11-04

## 2024-11-04 RX ORDER — VARENICLINE TARTRATE 1 MG/1
1 TABLET, FILM COATED ORAL 2 TIMES DAILY
Qty: 120 TABLET | Refills: 0 | Status: SHIPPED | OUTPATIENT
Start: 2024-11-04 | End: 2025-01-03

## 2024-11-04 RX ORDER — LEVOTHYROXINE SODIUM 137 UG/1
137 TABLET ORAL DAILY
Qty: 30 TABLET | Refills: 0 | Status: SHIPPED | OUTPATIENT
Start: 2024-11-04

## 2024-11-04 RX ORDER — IPRATROPIUM BROMIDE AND ALBUTEROL SULFATE 2.5; .5 MG/3ML; MG/3ML
3 SOLUTION RESPIRATORY (INHALATION) EVERY 4 HOURS PRN
Qty: 360 ML | Refills: 3 | Status: SHIPPED | OUTPATIENT
Start: 2024-11-04

## 2024-11-04 RX ORDER — GABAPENTIN 300 MG/1
300 CAPSULE ORAL 3 TIMES DAILY PRN
Qty: 90 CAPSULE | Refills: 1 | Status: CANCELLED | OUTPATIENT
Start: 2024-11-04

## 2024-11-04 RX ORDER — VALACYCLOVIR HYDROCHLORIDE 500 MG/1
TABLET, FILM COATED ORAL
Start: 2024-11-04

## 2024-11-04 RX ORDER — UMECLIDINIUM BROMIDE AND VILANTEROL TRIFENATATE 62.5; 25 UG/1; UG/1
POWDER RESPIRATORY (INHALATION)
Qty: 180 EACH | Refills: 12 | Status: SHIPPED | OUTPATIENT
Start: 2024-11-04

## 2024-11-04 RX ADMIN — CYANOCOBALAMIN 1000 MCG: 1000 INJECTION, SOLUTION INTRAMUSCULAR; SUBCUTANEOUS at 14:28

## 2024-11-04 NOTE — PROGRESS NOTES
" Subjective     Medicare Wellness Visit      Elena Ma is a 64 y.o. patient who presents for a Welcome to Medicare Wellness Visit.    Hypercholesterolemia, stable  CKD stage I-stable  COPD stable at baseline.  Uses Ventolin inhaler, DuoNebs as needed, Anoro daily.  Due lung cancer screening.  She quit smoking in May but she continues \"smoking when she drinks\" during [employer] basketball games.  She has >20 pack year history.  She wants to continue Chantix.    Follows rheumatology for osteoarthritis.  Prescribed Prolia infusions. Taking vitamin D and calcium.   TSH was slightly elevated during last check in June.  Her dose was decreased to 137 mcg.  She is taking her medication as prescribed.   History of neck, left shoulder pain/arthritis.  She takes gabapentin and Robaxin as needed. She also uses lidocaine patches, Voltaren gel as needed.  It has been doing better so she has not needed it lately. No signs of abuse or overuse.  Historically declines PT or Ortho referral.    Patient followed up with gastroenterologist regarding abnormal CT of abdomen.  They told her this is nothing to worry about and she can follow-up with them as needed.  She has a history of total colectomy due to Crohn's disease.  She has a colostomy.  Takes omeprazole PRN for GERD which is stable.  B12 deficiency- prescribed injections but sister was afraid to administer them so she has not been taking   Estab with urology for urge incontinence, was prescribed Vibegron 75 mg and not taking it   Patient states depression and anxiety are controlled but symptoms do vary. Worsen around this time as it has been 4 years since daughter passed. Takes trazodone prn for insomnia which helps.   Chronically underweight, has gained 5 lbs since the summer.  Admits that she drinks 3 alcoholic drinks to get a buzz during [employer] basketball games. States she only drinks during the games.   HSV- takes valtrex prn for flares   Vaccines and screenings reviewed     The " "following portions of the patient's history were reviewed and     updated as appropriate: allergies, current medications, past family history, past medical history, past social history, past surgical history, and problem list.    Compared to one year ago, the patient's physical   health is worse.  Compared to one year ago, the patient's mental   health is worse.    Recent Hospitalizations:  She was not admitted to the hospital during the last year.     Current Medical Providers:  Patient Care Team:  Jenifer Scherer APRN as PCP - General (Family Medicine)  Alphonso Herrera MD as Consulting Physician (Neurosurgery)  Merly Young MD as Referring Physician (Internal Medicine)    Outpatient Medications Prior to Visit   Medication Sig Dispense Refill    calcium carbonate (Calcium 600) 600 MG tablet Take 1 tablet by mouth 2 (Two) Times a Day With Meals. 180 tablet 3    cyanocobalamin 1000 MCG/ML injection Inject 1 mL into the appropriate muscle as directed by prescriber Every 28 (Twenty-Eight) Days. 1 mL 11    denosumab (PROLIA) 60 MG/ML solution prefilled syringe syringe Inject  under the skin into the appropriate area as directed 1 (One) Time.      Diclofenac Sodium (VOLTAREN EX) Apply  topically.      gabapentin (NEURONTIN) 300 MG capsule Take 1 capsule by mouth 3 (Three) Times a Day As Needed (neuropathc pain). 90 capsule 0    Lidoderm 5 % Remove & Discard patch within 12 hours or as directed by MD. DX:Arthralgia of left shoulder region 30 patch 5    Magnesium 250 MG tablet Take 1 tablet by mouth Daily.      methocarbamol (ROBAXIN) 750 MG tablet Take 1 tablet by mouth 2 (Two) Times a Day As Needed for Muscle Spasms. 30 tablet 0    ondansetron (ZOFRAN) 4 MG tablet Take 1 tablet by mouth As Needed for Nausea or Vomiting.      Syringe/Needle, Disp, 25G X 1\" 1 ML misc Use as directed to inject b12 once a month 1 each 11    Anoro Ellipta 62.5-25 MCG/ACT aerosol powder  inhaler Inhale 1 puff by mouth once " daily 180 each 2    Cholecalciferol (vitamin D3) 125 MCG (5000 UT) tablet tablet Take 1 tablet by mouth Daily. 90 tablet 1    ibuprofen (ADVIL,MOTRIN) 800 MG tablet Take 1 tablet by mouth 3 (Three) Times a Day As Needed.      ipratropium-albuterol (DUO-NEB) 0.5-2.5 mg/3 ml nebulizer Take 3 mL by nebulization Every 4 (Four) Hours As Needed for Wheezing or Shortness of Air. DX: J44.9 COPD 360 mL 3    levothyroxine (SYNTHROID, LEVOTHROID) 137 MCG tablet Take 1 tablet by mouth once daily 90 tablet 0    Omeprazole 20 MG tablet delayed-release Take 20 mg by mouth Daily. 90 each 2    Potassium 99 MG tablet Take 1,000 mcg by mouth Daily.      traZODone (DESYREL) 50 MG tablet TAKE 1 TABLET BY MOUTH AT NIGHT AS NEEDED FOR SLEEP 90 tablet 0    valACYclovir (VALTREX) 500 MG tablet Take 1 tablet by mouth Daily. 90 tablet 1    varenicline (CHANTIX) 1 MG tablet Take 1 tablet by mouth twice daily 56 tablet 0    Ventolin  (90 Base) MCG/ACT inhaler Inhale 2 puffs Every 4 (Four) Hours As Needed for Wheezing. 18 g 3    Vibegron (Gemtesa) 75 MG tablet Take 1 tablet by mouth Daily. 30 tablet 11     No facility-administered medications prior to visit.     No opioid medication identified on active medication list. I have reviewed chart for other potential  high risk medication/s and harmful drug interactions in the elderly.      Aspirin is not on active medication list.  Aspirin use is not indicated based on review of current medical condition/s. Risk of harm outweighs potential benefits.  .    Patient Active Problem List   Diagnosis    Senile osteoporosis    Chronic obstructive pulmonary disease    Acquired hypothyroidism    Vitamin D deficiency    Insomnia due to stress    Nicotine abuse    Alcohol use    Gastroesophageal reflux disease without esophagitis    B12 deficiency    Crohn's disease without complication    Chronic renal impairment, stage 1    Hypercholesterolemia    Herpes simplex virus (HSV) infection     Advance Care  "Planning   ACP discussion was held with the patient during this visit. Patient has an advance directive (not in EMR), copy requested.      Objective   Vitals:    24 1316   BP: 116/64   Pulse: 59   Temp: 98 °F (36.7 °C)   SpO2: 98%   Weight: 44.5 kg (98 lb)   Height: 154.9 cm (60.98\")   PainSc:   2   PainLoc: Back       Estimated body mass index is 18.53 kg/m² as calculated from the following:    Height as of this encounter: 154.9 cm (60.98\").    Weight as of this encounter: 44.5 kg (98 lb).    BMI is within normal parameters. No other follow-up for BMI required.    Gait and Balance Evaluation:  Normal  Does the patient have evidence of cognitive impairment? No       ECG 12 Lead    Date/Time: 2024 2:08 PM  Performed by: Jenifer Scherer APRN    Authorized by: Jenifer Scherer APRN  Previous ECG: no previous ECG available  Rhythm: sinus bradycardia  Rate: bradycardic  BPM: 57    Clinical impression: abnormal EKG  Comments: Pattern consistent with pulmonary disease  Incomplete right bundle branch block  Possible right ventricular hypertrophy                                                                                             Health  Risk Assessment    Smoking Status:  Social History     Tobacco Use   Smoking Status Some Days    Current packs/day: 1.00    Average packs/day: 1 pack/day for 50.0 years (50.0 ttl pk-yrs)    Types: Cigarettes    Passive exposure: Yes   Smokeless Tobacco Never   Tobacco Comments    \"Smokes when she drinks. Drinks during [employer] basketball games\"      Alcohol Consumption:  Social History     Substance and Sexual Activity   Alcohol Use Yes    Alcohol/week: 3.0 standard drinks of alcohol    Types: 3 Drinks containing 0.5 oz of alcohol per week    Comment: during [employer] basketball games per pt     Fall Risk Screen  STEADI Fall Risk Assessment was completed, and patient is at LOW risk for falls.Assessment completed on:2024    Depression Screenin/4/2024     1:18 PM "   PHQ-2/PHQ-9 Depression Screening   Little interest or pleasure in doing things Not at all   Feeling down, depressed, or hopeless Several days   How difficult have these problems made it for you to do your work, take care of things at home, or get along with other people? Somewhat difficult     Health Habits and Functional and Cognitive Screenin/4/2024     1:00 PM   Functional & Cognitive Status   Do you have difficulty preparing food and eating? Yes   Do you have difficulty bathing yourself, getting dressed or grooming yourself? No   Do you have difficulty using the toilet? No   Do you have difficulty moving around from place to place? No   Do you have trouble with steps or getting out of a bed or a chair? Yes   Current Diet Unhealthy Diet   Dental Exam Not up to date   Eye Exam Up to date   Exercise (times per week) 0 times per week   Current Exercises Include No Regular Exercise   Do you need help using the phone?  No   Are you deaf or do you have serious difficulty hearing?  No   Do you need help to go to places out of walking distance? Yes   Do you need help shopping? Yes   Do you need help preparing meals?  No   Do you need help with housework?  No   Do you need help with laundry? No   Do you need help taking your medications? No   Do you need help managing money? No   Have you felt unusual stress, anger or loneliness in the last month? Yes   Who do you live with? Other   If you need help, do you have trouble finding someone available to you? No   Have you been bothered in the last four weeks by sexual problems? No   Do you have difficulty concentrating, remembering or making decisions? Yes   Visual Acuity:  Vision Screening    Right eye Left eye Both eyes   Without correction      With correction 20/15 20/25 20/13     Age-appropriate Screening Schedule:  Refer to the list below for future screening recommendations based on patient's age, sex and/or medical conditions. Orders for these recommended  tests are listed in the plan section. The patient has been provided with a written plan.    Health Maintenance List  Health Maintenance   Topic Date Due    LIPID PANEL  07/18/2024    ZOSTER VACCINE (1 of 2) 11/04/2024 (Originally 3/7/2009)    COVID-19 Vaccine (4 - 2024-25 season) 01/24/2025 (Originally 9/1/2024)    Pneumococcal Vaccine 65+ (2 of 2 - PCV) 04/25/2025 (Originally 1/11/2019)    INFLUENZA VACCINE  03/31/2026 (Originally 8/1/2024)    ANNUAL WELLNESS VISIT  11/04/2025    PAP SMEAR  12/13/2025    MAMMOGRAM  01/18/2026    DXA SCAN  01/18/2026    TDAP/TD VACCINES (2 - Td or Tdap) 03/22/2033    HEPATITIS C SCREENING  Completed    LUNG CANCER SCREENING  Discontinued    COLORECTAL CANCER SCREENING  Discontinued                                                                                                                                                Risk Factors Identified During Encounter  Alcohol Misuse: Patient encouraged to stop all alcohol use   Chronic Pain: Natural history and expected course discussed. Questions answered.  Depression/Dysphoria: 4 years since daughter passed so seasonally gets depressed, declines intervention   Immunizations Discussed/Encouraged: Influenza, Prevnar 20 (Pneumococcal 20-valent conjugate), Shingrix, COVID19, and RSV (Respiratory Syncytial Virus) She declines today   Dental Screening Recommended  Vision Screening Recommended    The above risks/problems have been discussed with the patient.  Pertinent information has been shared with the patient in the After Visit Summary.  An After Visit Summary and PPPS were made available to the patient.    Follow Up:   Next Medicare Wellness visit to be scheduled in 1 year.     Diagnoses and all orders for this visit:    1. Welcome to Medicare preventive visit (Primary)  -     ECG 12 Lead    2. Hypercholesterolemia  -     Lipid Panel    3. Chronic renal impairment, stage 1  -     CBC (No Diff)  -     Comprehensive Metabolic Panel    4.  Chronic obstructive pulmonary disease  -     ipratropium-albuterol (DUO-NEB) 0.5-2.5 mg/3 ml nebulizer; Take 3 mL by nebulization Every 4 (Four) Hours As Needed for Wheezing or Shortness of Air. DX: J44.9 COPD  Dispense: 360 mL; Refill: 3  -     Anoro Ellipta 62.5-25 MCG/ACT aerosol powder  inhaler; Inhale 1 puff by mouth once daily  Dispense: 180 each; Refill: 12  -     Ventolin  (90 Base) MCG/ACT inhaler; Inhale 2 puffs Every 4 (Four) Hours As Needed for Wheezing.  Dispense: 18 g; Refill: 12    5. Smoking greater than 20 pack years  -     CT Chest Low Dose Wo; Future    6. Acquired hypothyroidism  -     TSH Rfx On Abnormal To Free T4  -     levothyroxine (SYNTHROID, LEVOTHROID) 137 MCG tablet; Take 1 tablet by mouth Daily.  Dispense: 30 tablet; Refill: 0    7. Senile osteoporosis    8. Vitamin D deficiency  -     Vitamin D,25-Hydroxy  -     Cholecalciferol (vitamin D3) 125 MCG (5000 UT) tablet tablet; Take 1 tablet by mouth Daily.  Dispense: 90 tablet; Refill: 3    9. Insomnia due to stress  -     traZODone (DESYREL) 50 MG tablet; Take 1 tablet by mouth At Night As Needed for Sleep.  Dispense: 90 tablet; Refill: 3    10. Nicotine abuse  -     varenicline (CHANTIX) 1 MG tablet; Take 1 tablet by mouth 2 (Two) Times a Day for 60 days. Then discontinue.  Dispense: 120 tablet; Refill: 0    11. Alcohol use    12. Gastroesophageal reflux disease without esophagitis  -     Omeprazole 20 MG tablet delayed-release; Take 20 mg by mouth Daily As Needed (GERD).  Dispense: 90 each; Refill: 3    13. Crohn's disease without complication, unspecified gastrointestinal tract location    14. B12 deficiency  -     Vitamin B12  -     cyanocobalamin injection 1,000 mcg    15. Herpes simplex virus (HSV) infection  -     valACYclovir (VALTREX) 500 MG tablet; Take 500 mg po BID x 3 days prn HSV flare    16. Urge incontinence    17. Medication monitoring encounter  -     ToxAssure Flex 23, Ur -    18. Anxiety and depression    19.  "Adult BMI <19 kg/sq m    20. Screening mammogram for breast cancer  -     Mammo screening digital tomosynthesis bilateral w CAD; Future    21. Abnormal EKG  -     Ambulatory Referral to Cardiology        Plan:  Hypercholesterolemia-follow low-cholesterol diet and exercise regimen, update lipid panel fasting  Chronic renal impairment stage I-update CBC, CMP  COPD stable, continue inhalers as prescribed, lung cancer screening ordered  Follows rheumatology for osteoarthritis.  Prescribed Prolia infusions. Continue vitamin D and calcium.   Continue levothyroxine 137 mcg daily, update TSH.     History of neck, left shoulder pain/arthritis, stable.  She takes gabapentin and Robaxin as needed. She also uses lidocaine patches, Voltaren gel as needed. Historically declines PT or Ortho referral.  Drug screening completed. CSA on file. Freddy appropriate. No signs of abuse or overuse.   Chron's/Gerd- stable, f/u GI prn   B12 deficiency- b12 administered, bring in injections once a month to be administered   Estab with urology for urge incontinence, was prescribed Vibegron 75 mg and not taking it, condition stable for now   Patient states depression and anxiety are controlled but symptoms do vary. Worsen around this time as it has been 4 years since daughter passed. Takes trazodone prn for insomnia which helps.   Chronically underweight, has gained 5 lbs since the summer.  Admits that she drinks 3 alcoholic drinks to get a buzz during [employer] basketball games. States she only drinks and smokes during games. This is discouraged, risks reviewed.  Continue Chantix for tobacco cessation.  HSV- takes valtrex prn for flares, stable   Referral to cardiology due to abnormal EKG and high cardiovascular risk for further evaluation    HCM:  Health Maintenance screenings and vaccinations updated, encouraged  Mammogram; ordered  Pap smear; f/u GYN pt states \"no one can do a pap on her due to tilted pelvis\"  Colon cancer screenings- hx colectomy, " f/u GI  DEXA scan UTD, follows rheumatology   Encouraged 150 minutes of exercise total per week for heart health   Recommend balanced diet, increase calories/protein, avoid nicotine and alcohol   Dental visits twice per year, yearly eye exams, yearly dermatology assessments   Vitamin D 2000 IU, Calcium 1200 mg daily      Follow Up:  Return in about 1 year (around 11/4/2025) for Medicare Wellness.      Jenifer Scherer, APRN

## 2024-11-05 DIAGNOSIS — R30.0 DYSURIA: ICD-10-CM

## 2024-11-05 DIAGNOSIS — E03.9 ACQUIRED HYPOTHYROIDISM: Primary | ICD-10-CM

## 2024-11-05 LAB
25(OH)D3+25(OH)D2 SERPL-MCNC: 19.1 NG/ML (ref 30–100)
ALBUMIN SERPL-MCNC: 4.5 G/DL (ref 3.5–5.2)
ALBUMIN/GLOB SERPL: 1.6 G/DL
ALP SERPL-CCNC: 73 U/L (ref 39–117)
ALT SERPL-CCNC: 17 U/L (ref 1–33)
AST SERPL-CCNC: 23 U/L (ref 1–32)
BILIRUB SERPL-MCNC: 0.4 MG/DL (ref 0–1.2)
BUN SERPL-MCNC: 11 MG/DL (ref 8–23)
BUN/CREAT SERPL: 11.5 (ref 7–25)
CALCIUM SERPL-MCNC: 8.9 MG/DL (ref 8.6–10.5)
CHLORIDE SERPL-SCNC: 103 MMOL/L (ref 98–107)
CHOLEST SERPL-MCNC: 258 MG/DL (ref 0–200)
CO2 SERPL-SCNC: 24.5 MMOL/L (ref 22–29)
CREAT SERPL-MCNC: 0.96 MG/DL (ref 0.57–1)
EGFRCR SERPLBLD CKD-EPI 2021: 65.8 ML/MIN/1.73
ERYTHROCYTE [DISTWIDTH] IN BLOOD BY AUTOMATED COUNT: 13.7 % (ref 12.3–15.4)
GLOBULIN SER CALC-MCNC: 2.9 GM/DL
GLUCOSE SERPL-MCNC: 82 MG/DL (ref 65–99)
HCT VFR BLD AUTO: 44 % (ref 34–46.6)
HDLC SERPL-MCNC: 120 MG/DL (ref 40–60)
HGB BLD-MCNC: 14.5 G/DL (ref 12–15.9)
LDLC SERPL CALC-MCNC: 119 MG/DL (ref 0–100)
MCH RBC QN AUTO: 31.8 PG (ref 26.6–33)
MCHC RBC AUTO-ENTMCNC: 33 G/DL (ref 31.5–35.7)
MCV RBC AUTO: 96.5 FL (ref 79–97)
PLATELET # BLD AUTO: 217 10*3/MM3 (ref 140–450)
POTASSIUM SERPL-SCNC: 4.5 MMOL/L (ref 3.5–5.2)
PROT SERPL-MCNC: 7.4 G/DL (ref 6–8.5)
RBC # BLD AUTO: 4.56 10*6/MM3 (ref 3.77–5.28)
SODIUM SERPL-SCNC: 140 MMOL/L (ref 136–145)
T4 FREE SERPL-MCNC: 1.5 NG/DL (ref 0.93–1.7)
TRIGL SERPL-MCNC: 114 MG/DL (ref 0–150)
TSH SERPL DL<=0.005 MIU/L-ACNC: 35.5 UIU/ML (ref 0.27–4.2)
VIT B12 SERPL-MCNC: >2000 PG/ML (ref 211–946)
VLDLC SERPL CALC-MCNC: 19 MG/DL (ref 5–40)
WBC # BLD AUTO: 12.75 10*3/MM3 (ref 3.4–10.8)

## 2024-11-05 NOTE — PROGRESS NOTES
Has she been taking thyroid medication as prescribed or missed any doses? TSH is 35.5 with normal T4. If she's been taking normally will need to go up on dose and I would like to get a thyroid US  Vitamin D is low- we talked about this but tell her to take 5000 international units of vitamin D3 over-the-counter every day with her calcium  B12 is elevated so if she is taking a B12 supplement she can take it 3 times a week rather than daily  White count is slightly elevated will monitor    Total cholesterol 258, HDL or good cholesterol is high which is likely bringing the total up.  Bad cholesterol is 119 with normal being less than 100.  Work on low-cholesterol diet. Will allow cardiologist to decide if he thinks you need cholesterol medication based on your risk factors - fam hx of CVD and smoking

## 2024-11-05 NOTE — PROGRESS NOTES
I dont think we can add on a urine she would have to come in for one, I'm ok with this   Needs to take the thyroid medication every single day in the morning on empty stomach, wait 60 minutes before eating, avoid other meds/vitamins for 4 hours after taking, then recheck thyroid in 6 weeks. I did order a thyroid us.

## 2024-11-07 LAB
1OH-MIDAZOLAM UR QL SCN: NOT DETECTED NG/MG CREAT
6MAM UR QL SCN: NEGATIVE NG/ML
7AMINOCLONAZEPAM/CREAT UR: NOT DETECTED NG/MG CREAT
A-OH ALPRAZ/CREAT UR: NOT DETECTED NG/MG CREAT
A-OH-TRIAZOLAM/CREAT UR CFM: NOT DETECTED NG/MG CREAT
ACP UR QL CFM: NOT DETECTED
ALPRAZ/CREAT UR CFM: NOT DETECTED NG/MG CREAT
AMPHETAMINES UR QL SCN: NEGATIVE NG/ML
APAP UR QL SCN: NEGATIVE UG/ML
BARBITURATES UR QL SCN: NEGATIVE NG/ML
BENZODIAZ SCN METH UR: NEGATIVE
BUPRENORPHINE UR QL SCN: NEGATIVE
BUPRENORPHINE/CREAT UR: NOT DETECTED NG/MG CREAT
CANNABINOIDS UR QL SCN: NEGATIVE NG/ML
CARISOPRODOL UR QL: NEGATIVE NG/ML
CLONAZEPAM/CREAT UR CFM: NOT DETECTED NG/MG CREAT
COCAINE+BZE UR QL SCN: NEGATIVE NG/ML
CREAT UR-MCNC: 203 MG/DL
D-METHORPHAN UR-MCNC: NOT DETECTED NG/ML
D-METHORPHAN+LEVORPHANOL UR QL: NOT DETECTED
DESALKYLFLURAZ/CREAT UR: NOT DETECTED NG/MG CREAT
DIAZEPAM/CREAT UR: NOT DETECTED NG/MG CREAT
ETHANOL UR QL SCN: NEGATIVE G/DL
ETHANOL UR QL SCN: NEGATIVE NG/ML
FENTANYL CTO UR SCN-MCNC: NEGATIVE NG/ML
FENTANYL/CREAT UR: NOT DETECTED NG/MG CREAT
FLUNITRAZEPAM UR QL SCN: NOT DETECTED NG/MG CREAT
GABAPENTIN UR-MCNC: NEGATIVE UG/ML
HALLUCINOGENS UR: NEGATIVE
HYPNOTICS UR QL SCN: NEGATIVE
KETAMINE UR QL: NOT DETECTED
LORAZEPAM/CREAT UR: NOT DETECTED NG/MG CREAT
MEPERIDINE UR QL SCN: NEGATIVE NG/ML
METHADONE UR QL SCN: NEGATIVE NG/ML
METHADONE+METAB UR QL SCN: NEGATIVE NG/ML
MIDAZOLAM/CREAT UR CFM: NOT DETECTED NG/MG CREAT
MISCELLANEOUS, UR: NEGATIVE
NORBUPRENORPHINE/CREAT UR: NOT DETECTED NG/MG CREAT
NORDIAZEPAM/CREAT UR: NOT DETECTED NG/MG CREAT
NORFENTANYL/CREAT UR: NOT DETECTED NG/MG CREAT
NORFLUNITRAZEPAM UR-MCNC: NOT DETECTED NG/MG CREAT
NORKETAMINE UR-MCNC: NOT DETECTED UG/ML
OPIATES UR SCN-MCNC: NEGATIVE NG/ML
OXAZEPAM/CREAT UR: NOT DETECTED NG/MG CREAT
OXYCODONE CTO UR SCN-MCNC: NEGATIVE NG/ML
PCP UR QL SCN: NEGATIVE NG/ML
PRESCRIBED MEDICATIONS: NORMAL
PROPOXYPH UR QL SCN: NEGATIVE NG/ML
TAPENTADOL CTO UR SCN-MCNC: NEGATIVE NG/ML
TEMAZEPAM/CREAT UR: NOT DETECTED NG/MG CREAT
TRAMADOL UR QL SCN: NEGATIVE NG/ML
ZALEPLON UR-MCNC: NOT DETECTED NG/ML
ZOLPIDEM PHENYL-4-CARB UR QL SCN: NOT DETECTED
ZOLPIDEM UR QL SCN: NOT DETECTED
ZOPICLONE-N-OXIDE UR-MCNC: NOT DETECTED NG/ML

## 2024-12-03 ENCOUNTER — OFFICE VISIT (OUTPATIENT)
Dept: CARDIOLOGY | Facility: CLINIC | Age: 64
End: 2024-12-03
Payer: MEDICARE

## 2024-12-03 VITALS
HEART RATE: 80 BPM | BODY MASS INDEX: 18.96 KG/M2 | HEIGHT: 61 IN | OXYGEN SATURATION: 94 % | DIASTOLIC BLOOD PRESSURE: 68 MMHG | WEIGHT: 100.4 LBS | SYSTOLIC BLOOD PRESSURE: 108 MMHG

## 2024-12-03 DIAGNOSIS — R06.00 DYSPNEA, UNSPECIFIED TYPE: Primary | ICD-10-CM

## 2024-12-03 PROCEDURE — 99204 OFFICE O/P NEW MOD 45 MIN: CPT | Performed by: INTERNAL MEDICINE

## 2024-12-03 NOTE — PROGRESS NOTES
[facility] Cardiology OP Consult Note    Elena Ma  [id number]  2024    Referred By: Jenifer Scherer APRN    Chief Complaint: Abnormal EKG    History of Present Illness:   Mrs. Elena Ma is a 64 y.o. female who presents to the Cardiology Clinic for evaluation of an abnormal ECG.  The patient has a past medical history significant for hyperlipidemia, Crohn's disease, and COPD with chronic tobacco use.  She does not have any significant past cardiac history, however has a strong family history of CAD.  She was recently evaluated by her PCP, and found to have an abnormal ECG with a borderline right bundle branch block and possible RVH.  She was subsequent sent to cardiology for further evaluation.  Currently, the patient denies any significant chest pain or exertional chest discomfort.  No episodes of palpitations.  She does have chronic exertional dyspnea in the setting of COPD.  No orthopnea or PND.  She denies any history of lower extremity edema.  No other specific complaints today.    Past Cardiac Testin. Last Coronary Angio: None  2. Prior Stress Testing: None  3. Last Echo: None  4. Prior Holter Monitor: None    Review of Systems:   Review of Systems   Constitutional:  Negative for activity change, appetite change, chills, diaphoresis, fatigue, fever, unexpected weight gain and unexpected weight loss.   Eyes:  Negative for blurred vision and double vision.   Respiratory:  Positive for shortness of breath. Negative for cough, chest tightness and wheezing.    Cardiovascular:  Negative for chest pain, palpitations and leg swelling.   Gastrointestinal:  Negative for abdominal pain, anal bleeding, blood in stool and GERD.   Endocrine: Negative for cold intolerance and heat intolerance.   Genitourinary:  Negative for hematuria.   Neurological:  Negative for dizziness, syncope, weakness and light-headedness.   Hematological:  Does not bruise/bleed easily.  "  Psychiatric/Behavioral:  Negative for depressed mood and stress. The patient is not nervous/anxious.        Past Medical History:   Past Medical History:   Diagnosis Date    Arthritis     Back problem     Bronchitis     Cervical compression fracture 07/2022    treated conservatively with a brace x 5 weeks    COPD, moderate     Crohn disease     Disease of thyroid gland     H/O bladder infections     Hernia, internal     Kidney stone     Osteoporosis     Pneumonia     Rotator cuff syndrome 7 2022    Scoliosis 7 2020    Thyroid disease     Urinary tract infection        Past Surgical History:   Past Surgical History:   Procedure Laterality Date    COLON SURGERY      ECTOPIC PREGNANCY SURGERY      ILEOSTOMY      STOMACH SURGERY      VAGINA RECONSTRUCTION SURGERY         Family History:   Family History   Problem Relation Age of Onset    Cancer Mother     Heart disease Father     Heart attack Father     Diabetes Sister     Scoliosis Sister     Kidney disease Sister     Crohn's disease Sister     Lung disease Sister     Kidney disease Brother     Diabetes Brother     Arthritis Brother     Aneurysm Brother     Gout Brother     Broken bones Brother     Cancer Brother     Arthritis Brother     Diabetes Brother     Aneurysm Brother     Breast cancer Neg Hx     Ovarian cancer Neg Hx        Social History:   Social History     Socioeconomic History    Marital status:    Tobacco Use    Smoking status: Some Days     Current packs/day: 1.00     Average packs/day: 1 pack/day for 50.0 years (50.0 ttl pk-yrs)     Types: Cigarettes     Passive exposure: Yes    Smokeless tobacco: Never    Tobacco comments:     \"Smokes when she drinks. Drinks during [employer] basketball games\"    Vaping Use    Vaping status: Never Used   Substance and Sexual Activity    Alcohol use: Yes     Alcohol/week: 3.0 standard drinks of alcohol     Types: 3 Drinks containing 0.5 oz of alcohol per week     Comment: during [employer] basketball games per pt    Drug " use: Never    Sexual activity: Not Currently       Medications:     Current Outpatient Medications:     Anoro Ellipta 62.5-25 MCG/ACT aerosol powder  inhaler, Inhale 1 puff by mouth once daily, Disp: 180 each, Rfl: 12    calcium carbonate (Calcium 600) 600 MG tablet, Take 1 tablet by mouth 2 (Two) Times a Day With Meals., Disp: 180 tablet, Rfl: 3    Cholecalciferol (vitamin D3) 125 MCG (5000 UT) tablet tablet, Take 1 tablet by mouth Daily., Disp: 90 tablet, Rfl: 3    cyanocobalamin 1000 MCG/ML injection, Inject 1 mL into the appropriate muscle as directed by prescriber Every 28 (Twenty-Eight) Days., Disp: 1 mL, Rfl: 11    denosumab (PROLIA) 60 MG/ML solution prefilled syringe syringe, Inject  under the skin into the appropriate area as directed 1 (One) Time., Disp: , Rfl:     Diclofenac Sodium (VOLTAREN EX), Apply  topically., Disp: , Rfl:     gabapentin (NEURONTIN) 300 MG capsule, Take 1 capsule by mouth 3 (Three) Times a Day As Needed (neuropathc pain)., Disp: 90 capsule, Rfl: 0    ipratropium-albuterol (DUO-NEB) 0.5-2.5 mg/3 ml nebulizer, Take 3 mL by nebulization Every 4 (Four) Hours As Needed for Wheezing or Shortness of Air. DX: J44.9 COPD, Disp: 360 mL, Rfl: 3    levothyroxine (SYNTHROID, LEVOTHROID) 137 MCG tablet, Take 1 tablet by mouth Daily., Disp: 30 tablet, Rfl: 0    Lidoderm 5 %, Remove & Discard patch within 12 hours or as directed by MD. DX:Arthralgia of left shoulder region, Disp: 30 patch, Rfl: 5    Magnesium 250 MG tablet, Take 1 tablet by mouth Daily., Disp: , Rfl:     methocarbamol (ROBAXIN) 750 MG tablet, Take 1 tablet by mouth 2 (Two) Times a Day As Needed for Muscle Spasms., Disp: 30 tablet, Rfl: 0    omeprazole (priLOSEC) 20 MG capsule, Take 1 capsule by mouth Daily., Disp: , Rfl:     Omeprazole 20 MG tablet delayed-release, Take 20 mg by mouth Daily As Needed (GERD)., Disp: 90 each, Rfl: 3    ondansetron (ZOFRAN) 4 MG tablet, Take 1 tablet by mouth As Needed for Nausea or Vomiting., Disp: ,  "Rfl:     Syringe/Needle, Disp, 25G X 1\" 1 ML misc, Use as directed to inject b12 once a month, Disp: 1 each, Rfl: 11    traZODone (DESYREL) 50 MG tablet, Take 1 tablet by mouth At Night As Needed for Sleep., Disp: 90 tablet, Rfl: 3    valACYclovir (VALTREX) 500 MG tablet, Take 500 mg po BID x 3 days prn HSV flare, Disp: , Rfl:     varenicline (CHANTIX) 1 MG tablet, Take 1 tablet by mouth 2 (Two) Times a Day for 60 days. Then discontinue., Disp: 120 tablet, Rfl: 0    Ventolin  (90 Base) MCG/ACT inhaler, Inhale 2 puffs Every 4 (Four) Hours As Needed for Wheezing., Disp: 18 g, Rfl: 12    Allergies:   Allergies   Allergen Reactions    Codeine GI Intolerance    Hydrocodone Other (See Comments)     nausea    Levofloxacin Rash    Zithromax [Azithromycin] Itching       Physical Exam:  Vital Signs:   Vitals:    12/03/24 1024   BP: 108/68   BP Location: Left arm   Patient Position: Sitting   Cuff Size: Adult   Pulse: 80   SpO2: 94%   Weight: 45.5 kg (100 lb 6.4 oz)   Height: 154.9 cm (61\")       Physical Exam  Constitutional:       General: She is not in acute distress.     Appearance: Normal appearance. She is well-developed. She is not diaphoretic.   HENT:      Head: Normocephalic and atraumatic.   Eyes:      General: No scleral icterus.     Pupils: Pupils are equal, round, and reactive to light.   Neck:      Trachea: No tracheal deviation.   Cardiovascular:      Rate and Rhythm: Normal rate and regular rhythm.      Heart sounds: Normal heart sounds. No murmur heard.     No friction rub. No gallop.      Comments: Normal JVD.    Pulmonary:      Effort: Pulmonary effort is normal. No respiratory distress.      Breath sounds: Normal breath sounds. No stridor. No wheezing or rales.   Chest:      Chest wall: No tenderness.   Abdominal:      General: Bowel sounds are normal. There is no distension.      Palpations: Abdomen is soft.      Tenderness: There is no abdominal tenderness. There is no guarding or rebound. "   Musculoskeletal:         General: No swelling. Normal range of motion.      Cervical back: Neck supple. No tenderness.   Lymphadenopathy:      Cervical: No cervical adenopathy.   Skin:     General: Skin is warm and dry.      Findings: No erythema.   Neurological:      General: No focal deficit present.      Mental Status: She is alert and oriented to person, place, and time.   Psychiatric:         Mood and Affect: Mood normal.         Behavior: Behavior normal.         Results Review:   I reviewed the patient's new clinical results.        Assessment / Plan:     1.  Abnormal ECG  -- Recent ECG showed sinus rhythm, incomplete right bundle branch block and possible RVH  -- No current clinical evidence of CHF, no angina  -- Will obtain baseline echocardiogram to rule out significant structural valvular abnormalities, assess LVEF  -- Follow-up as needed, pending results of echocardiogram          Follow Up:   Return if symptoms worsen or fail to improve.      Thank you for allowing me to participate in the care of your patient. Please to not hesitate to contact me with additional questions or concerns.     KRYSTAL Desir MD  Interventional Cardiology   12/03/2024  10:34 EST

## 2024-12-30 DIAGNOSIS — J44.9 CHRONIC OBSTRUCTIVE PULMONARY DISEASE, UNSPECIFIED COPD TYPE: ICD-10-CM

## 2024-12-30 RX ORDER — IPRATROPIUM BROMIDE AND ALBUTEROL SULFATE 2.5; .5 MG/3ML; MG/3ML
3 SOLUTION RESPIRATORY (INHALATION) EVERY 4 HOURS PRN
Qty: 360 ML | Refills: 3 | Status: SHIPPED | OUTPATIENT
Start: 2024-12-30

## 2024-12-31 ENCOUNTER — TELEPHONE (OUTPATIENT)
Dept: INTERNAL MEDICINE | Facility: CLINIC | Age: 64
End: 2024-12-31
Payer: MEDICARE

## 2024-12-31 RX ORDER — ALBUTEROL SULFATE 1.25 MG/3ML
1 SOLUTION RESPIRATORY (INHALATION) EVERY 6 HOURS PRN
Qty: 360 ML | Refills: 12 | Status: SHIPPED | OUTPATIENT
Start: 2024-12-31

## 2024-12-31 NOTE — TELEPHONE ENCOUNTER
Caller: Elena Ma    Relationship: Self    Best call back number: [phone]    Which medication are you concerned about:     ipratropium-albuterol (DUO-NEB) 0.5-2.5 mg/3 ml nebulizer     What are your concerns:     INSURANCE COMPANY WILL NOT COVER

## 2025-01-02 NOTE — TELEPHONE ENCOUNTER
Pharmacy does not show this patient has any drug coverage.  There is a Medicaid glitch that is causing some pharmacy and providers to be non-preferred pharmacy or providers. This is being worked. I called patient to inform her of the issue with insurance. Patient will call [facility] and have both Neb meds. transferred to [facility]. She will  Duo nebs if covered.

## 2025-01-17 ENCOUNTER — HOSPITAL ENCOUNTER (OUTPATIENT)
Dept: ULTRASOUND IMAGING | Facility: HOSPITAL | Age: 65
Discharge: HOME | End: 2025-01-17
Payer: MEDICARE

## 2025-01-17 ENCOUNTER — HOSPITAL ENCOUNTER (OUTPATIENT)
Dept: CT IMAGING | Facility: HOSPITAL | Age: 65
Discharge: HOME | End: 2025-01-17
Payer: MEDICARE

## 2025-01-17 DIAGNOSIS — E03.9 ACQUIRED HYPOTHYROIDISM: ICD-10-CM

## 2025-01-17 DIAGNOSIS — F17.210 SMOKING GREATER THAN 20 PACK YEARS: ICD-10-CM

## 2025-01-17 PROCEDURE — 71271 CT THORAX LUNG CANCER SCR C-: CPT

## 2025-01-17 PROCEDURE — 76536 US EXAM OF HEAD AND NECK: CPT

## 2025-01-20 DIAGNOSIS — E04.1 LEFT THYROID NODULE: ICD-10-CM

## 2025-01-20 DIAGNOSIS — R22.1 MASS IN NECK: Primary | ICD-10-CM

## 2025-01-21 NOTE — PROGRESS NOTES
1 cm nodule on left thyroid gland. Recommendation is to repeat US in 1 year. I have ordered this to be scheduled next year.   Possible 5 mm lymph node noted superior pole of right lobe of thyroid, CT of neck with IV contrast is recommended to evaluate further. Will be contacted to schedule.